# Patient Record
Sex: FEMALE | Race: BLACK OR AFRICAN AMERICAN | Employment: FULL TIME | URBAN - METROPOLITAN AREA
[De-identification: names, ages, dates, MRNs, and addresses within clinical notes are randomized per-mention and may not be internally consistent; named-entity substitution may affect disease eponyms.]

---

## 2019-01-13 ENCOUNTER — HOSPITAL ENCOUNTER (OUTPATIENT)
Age: 34
Discharge: HOME | End: 2019-01-13
Attending: OBSTETRICS & GYNECOLOGY | Admitting: OBSTETRICS & GYNECOLOGY
Payer: COMMERCIAL

## 2019-01-13 VITALS
HEART RATE: 101 BPM | WEIGHT: 292 LBS | BODY MASS INDEX: 44.25 KG/M2 | HEIGHT: 68 IN | TEMPERATURE: 97.9 F | RESPIRATION RATE: 18 BRPM | SYSTOLIC BLOOD PRESSURE: 130 MMHG | DIASTOLIC BLOOD PRESSURE: 64 MMHG

## 2019-01-13 PROBLEM — Z34.93 NORMAL PREGNANCY IN THIRD TRIMESTER: Status: ACTIVE | Noted: 2019-01-13

## 2019-01-13 PROCEDURE — 99211 OFF/OP EST MAY X REQ PHY/QHP: CPT

## 2019-01-16 ENCOUNTER — ANESTHESIA (OUTPATIENT)
Dept: LABOR AND DELIVERY | Age: 34
End: 2019-01-16
Payer: COMMERCIAL

## 2019-01-16 ENCOUNTER — HOSPITAL ENCOUNTER (INPATIENT)
Age: 34
LOS: 3 days | Discharge: HOME | End: 2019-01-19
Attending: OBSTETRICS & GYNECOLOGY | Admitting: SPECIALIST
Payer: COMMERCIAL

## 2019-01-16 ENCOUNTER — ANESTHESIA EVENT (OUTPATIENT)
Dept: LABOR AND DELIVERY | Age: 34
End: 2019-01-16
Payer: COMMERCIAL

## 2019-01-16 VITALS — DIASTOLIC BLOOD PRESSURE: 54 MMHG | SYSTOLIC BLOOD PRESSURE: 98 MMHG | OXYGEN SATURATION: 97 %

## 2019-01-16 PROBLEM — O26.93 PREGNANCY, COMPLICATED, THIRD TRIMESTER: Status: ACTIVE | Noted: 2019-01-16

## 2019-01-16 LAB
ABO/RH: NORMAL
ALBUMIN SERPL-MCNC: 3.4 G/DL (ref 3.5–5.2)
ALP BLD-CCNC: 183 U/L (ref 35–104)
ALT SERPL-CCNC: 9 U/L (ref 0–32)
AMPHETAMINE SCREEN, URINE: NOT DETECTED
ANION GAP SERPL CALCULATED.3IONS-SCNC: 13 MMOL/L (ref 7–16)
ANTIBODY SCREEN: NORMAL
AST SERPL-CCNC: 28 U/L (ref 0–31)
BARBITURATE SCREEN URINE: NOT DETECTED
BENZODIAZEPINE SCREEN, URINE: NOT DETECTED
BILIRUB SERPL-MCNC: 0.5 MG/DL (ref 0–1.2)
BUN BLDV-MCNC: 7 MG/DL (ref 6–20)
CALCIUM SERPL-MCNC: 9.2 MG/DL (ref 8.6–10.2)
CANNABINOID SCREEN URINE: NOT DETECTED
CHLORIDE BLD-SCNC: 105 MMOL/L (ref 98–107)
CO2: 21 MMOL/L (ref 22–29)
COCAINE METABOLITE SCREEN URINE: NOT DETECTED
CREAT SERPL-MCNC: 0.5 MG/DL (ref 0.5–1)
GFR AFRICAN AMERICAN: >60
GFR NON-AFRICAN AMERICAN: >60 ML/MIN/1.73
GLUCOSE BLD-MCNC: 81 MG/DL (ref 74–99)
HCT VFR BLD CALC: 35.8 % (ref 34–48)
HEMOGLOBIN: 11.4 G/DL (ref 11.5–15.5)
MCH RBC QN AUTO: 31.2 PG (ref 26–35)
MCHC RBC AUTO-ENTMCNC: 31.8 % (ref 32–34.5)
MCV RBC AUTO: 98.1 FL (ref 80–99.9)
METHADONE SCREEN, URINE: NOT DETECTED
OPIATE SCREEN URINE: NOT DETECTED
PDW BLD-RTO: 13.1 FL (ref 11.5–15)
PHENCYCLIDINE SCREEN URINE: NOT DETECTED
PLATELET # BLD: 258 E9/L (ref 130–450)
PMV BLD AUTO: 9.1 FL (ref 7–12)
POTASSIUM SERPL-SCNC: 5 MMOL/L (ref 3.5–5)
PROPOXYPHENE SCREEN: NOT DETECTED
RBC # BLD: 3.65 E12/L (ref 3.5–5.5)
SODIUM BLD-SCNC: 139 MMOL/L (ref 132–146)
TOTAL PROTEIN: 7 G/DL (ref 6.4–8.3)
WBC # BLD: 8.7 E9/L (ref 4.5–11.5)

## 2019-01-16 PROCEDURE — 2709999900 HC NON-CHARGEABLE SUPPLY: Performed by: SPECIALIST

## 2019-01-16 PROCEDURE — 2500000003 HC RX 250 WO HCPCS: Performed by: ANESTHESIOLOGY

## 2019-01-16 PROCEDURE — 86901 BLOOD TYPING SEROLOGIC RH(D): CPT

## 2019-01-16 PROCEDURE — 6360000002 HC RX W HCPCS: Performed by: SPECIALIST

## 2019-01-16 PROCEDURE — 6360000002 HC RX W HCPCS: Performed by: ANESTHESIOLOGY

## 2019-01-16 PROCEDURE — 2580000003 HC RX 258: Performed by: SPECIALIST

## 2019-01-16 PROCEDURE — 2580000003 HC RX 258: Performed by: ANESTHESIOLOGY

## 2019-01-16 PROCEDURE — 36415 COLL VENOUS BLD VENIPUNCTURE: CPT

## 2019-01-16 PROCEDURE — 80053 COMPREHEN METABOLIC PANEL: CPT

## 2019-01-16 PROCEDURE — 6360000002 HC RX W HCPCS: Performed by: ADVANCED PRACTICE MIDWIFE

## 2019-01-16 PROCEDURE — 86850 RBC ANTIBODY SCREEN: CPT

## 2019-01-16 PROCEDURE — 3700000000 HC ANESTHESIA ATTENDED CARE: Performed by: SPECIALIST

## 2019-01-16 PROCEDURE — 51702 INSERT TEMP BLADDER CATH: CPT

## 2019-01-16 PROCEDURE — 2500000003 HC RX 250 WO HCPCS: Performed by: NURSE ANESTHETIST, CERTIFIED REGISTERED

## 2019-01-16 PROCEDURE — 2500000003 HC RX 250 WO HCPCS

## 2019-01-16 PROCEDURE — 6360000002 HC RX W HCPCS: Performed by: NURSE ANESTHETIST, CERTIFIED REGISTERED

## 2019-01-16 PROCEDURE — 1220000001 HC SEMI PRIVATE L&D R&B

## 2019-01-16 PROCEDURE — 3700000001 HC ADD 15 MINUTES (ANESTHESIA): Performed by: SPECIALIST

## 2019-01-16 PROCEDURE — 4A1HXCZ MONITORING OF PRODUCTS OF CONCEPTION, CARDIAC RATE, EXTERNAL APPROACH: ICD-10-PCS | Performed by: SPECIALIST

## 2019-01-16 PROCEDURE — 2580000003 HC RX 258: Performed by: ADVANCED PRACTICE MIDWIFE

## 2019-01-16 PROCEDURE — 85027 COMPLETE CBC AUTOMATED: CPT

## 2019-01-16 PROCEDURE — 80307 DRUG TEST PRSMV CHEM ANLYZR: CPT

## 2019-01-16 PROCEDURE — 3609079900 HC CESAREAN SECTION: Performed by: SPECIALIST

## 2019-01-16 PROCEDURE — 86900 BLOOD TYPING SEROLOGIC ABO: CPT

## 2019-01-16 PROCEDURE — 6370000000 HC RX 637 (ALT 250 FOR IP): Performed by: SPECIALIST

## 2019-01-16 RX ORDER — ONDANSETRON 2 MG/ML
4 INJECTION INTRAMUSCULAR; INTRAVENOUS EVERY 6 HOURS PRN
Status: DISCONTINUED | OUTPATIENT
Start: 2019-01-16 | End: 2019-01-19 | Stop reason: HOSPADM

## 2019-01-16 RX ORDER — ACETAMINOPHEN 325 MG/1
325 TABLET ORAL EVERY 4 HOURS PRN
Status: DISCONTINUED | OUTPATIENT
Start: 2019-01-16 | End: 2019-01-19 | Stop reason: HOSPADM

## 2019-01-16 RX ORDER — SODIUM CHLORIDE 0.9 % (FLUSH) 0.9 %
10 SYRINGE (ML) INJECTION PRN
Status: DISCONTINUED | OUTPATIENT
Start: 2019-01-16 | End: 2019-01-16

## 2019-01-16 RX ORDER — FENTANYL CITRATE 50 UG/ML
INJECTION, SOLUTION INTRAMUSCULAR; INTRAVENOUS PRN
Status: DISCONTINUED | OUTPATIENT
Start: 2019-01-16 | End: 2019-01-16 | Stop reason: SDUPTHER

## 2019-01-16 RX ORDER — EPHEDRINE SULFATE/0.9% NACL/PF 50 MG/5 ML
10 SYRINGE (ML) INTRAVENOUS ONCE
Status: COMPLETED | OUTPATIENT
Start: 2019-01-16 | End: 2019-01-16

## 2019-01-16 RX ORDER — KETOROLAC TROMETHAMINE 30 MG/ML
30 INJECTION, SOLUTION INTRAMUSCULAR; INTRAVENOUS EVERY 6 HOURS
Status: DISPENSED | OUTPATIENT
Start: 2019-01-16 | End: 2019-01-17

## 2019-01-16 RX ORDER — EPHEDRINE SULFATE 50 MG/ML
25 INJECTION, SOLUTION INTRAVENOUS ONCE
Status: DISCONTINUED | OUTPATIENT
Start: 2019-01-16 | End: 2019-01-16 | Stop reason: ALTCHOICE

## 2019-01-16 RX ORDER — NALOXONE HYDROCHLORIDE 0.4 MG/ML
0.4 INJECTION, SOLUTION INTRAMUSCULAR; INTRAVENOUS; SUBCUTANEOUS PRN
Status: DISCONTINUED | OUTPATIENT
Start: 2019-01-16 | End: 2019-01-19 | Stop reason: HOSPADM

## 2019-01-16 RX ORDER — TRISODIUM CITRATE DIHYDRATE AND CITRIC ACID MONOHYDRATE 500; 334 MG/5ML; MG/5ML
30 SOLUTION ORAL ONCE
Status: COMPLETED | OUTPATIENT
Start: 2019-01-16 | End: 2019-01-16

## 2019-01-16 RX ORDER — CEFAZOLIN SODIUM 2 G/50ML
2 SOLUTION INTRAVENOUS ONCE
Status: COMPLETED | OUTPATIENT
Start: 2019-01-16 | End: 2019-01-16

## 2019-01-16 RX ORDER — SODIUM CHLORIDE 0.9 % (FLUSH) 0.9 %
10 SYRINGE (ML) INJECTION EVERY 12 HOURS SCHEDULED
Status: DISCONTINUED | OUTPATIENT
Start: 2019-01-16 | End: 2019-01-19 | Stop reason: HOSPADM

## 2019-01-16 RX ORDER — SODIUM CHLORIDE 0.9 % (FLUSH) 0.9 %
10 SYRINGE (ML) INJECTION EVERY 12 HOURS SCHEDULED
Status: DISCONTINUED | OUTPATIENT
Start: 2019-01-16 | End: 2019-01-16

## 2019-01-16 RX ORDER — LANOLIN 100 %
OINTMENT (GRAM) TOPICAL
Status: DISCONTINUED | OUTPATIENT
Start: 2019-01-16 | End: 2019-01-19 | Stop reason: HOSPADM

## 2019-01-16 RX ORDER — EPHEDRINE SULFATE/0.9% NACL/PF 50 MG/5 ML
SYRINGE (ML) INTRAVENOUS PRN
Status: DISCONTINUED | OUTPATIENT
Start: 2019-01-16 | End: 2019-01-16 | Stop reason: SDUPTHER

## 2019-01-16 RX ORDER — SODIUM CHLORIDE, SODIUM LACTATE, POTASSIUM CHLORIDE, CALCIUM CHLORIDE 600; 310; 30; 20 MG/100ML; MG/100ML; MG/100ML; MG/100ML
INJECTION, SOLUTION INTRAVENOUS CONTINUOUS
Status: DISCONTINUED | OUTPATIENT
Start: 2019-01-16 | End: 2019-01-16

## 2019-01-16 RX ORDER — OXYCODONE HYDROCHLORIDE AND ACETAMINOPHEN 5; 325 MG/1; MG/1
1 TABLET ORAL EVERY 4 HOURS PRN
Status: DISCONTINUED | OUTPATIENT
Start: 2019-01-16 | End: 2019-01-17

## 2019-01-16 RX ORDER — IBUPROFEN 600 MG/1
600 TABLET ORAL EVERY 6 HOURS PRN
Status: DISCONTINUED | OUTPATIENT
Start: 2019-01-16 | End: 2019-01-19 | Stop reason: HOSPADM

## 2019-01-16 RX ORDER — SODIUM CHLORIDE, SODIUM LACTATE, POTASSIUM CHLORIDE, AND CALCIUM CHLORIDE .6; .31; .03; .02 G/100ML; G/100ML; G/100ML; G/100ML
500 INJECTION, SOLUTION INTRAVENOUS ONCE
Status: COMPLETED | OUTPATIENT
Start: 2019-01-16 | End: 2019-01-16

## 2019-01-16 RX ORDER — OXYCODONE HYDROCHLORIDE 5 MG/1
5 TABLET ORAL EVERY 4 HOURS PRN
Status: DISCONTINUED | OUTPATIENT
Start: 2019-01-16 | End: 2019-01-17

## 2019-01-16 RX ORDER — DOCUSATE SODIUM 100 MG/1
100 CAPSULE, LIQUID FILLED ORAL 2 TIMES DAILY
Status: DISCONTINUED | OUTPATIENT
Start: 2019-01-16 | End: 2019-01-19 | Stop reason: HOSPADM

## 2019-01-16 RX ORDER — NALBUPHINE HCL 10 MG/ML
5 AMPUL (ML) INJECTION EVERY 4 HOURS PRN
Status: DISPENSED | OUTPATIENT
Start: 2019-01-16 | End: 2019-01-17

## 2019-01-16 RX ORDER — OXYCODONE HYDROCHLORIDE 5 MG/1
10 TABLET ORAL EVERY 4 HOURS PRN
Status: DISCONTINUED | OUTPATIENT
Start: 2019-01-16 | End: 2019-01-17

## 2019-01-16 RX ORDER — SODIUM CHLORIDE, SODIUM LACTATE, POTASSIUM CHLORIDE, CALCIUM CHLORIDE 600; 310; 30; 20 MG/100ML; MG/100ML; MG/100ML; MG/100ML
INJECTION, SOLUTION INTRAVENOUS CONTINUOUS
Status: DISCONTINUED | OUTPATIENT
Start: 2019-01-16 | End: 2019-01-19 | Stop reason: HOSPADM

## 2019-01-16 RX ORDER — EPHEDRINE SULFATE/0.9% NACL/PF 50 MG/5 ML
SYRINGE (ML) INTRAVENOUS
Status: COMPLETED
Start: 2019-01-16 | End: 2019-01-16

## 2019-01-16 RX ORDER — ONDANSETRON 2 MG/ML
INJECTION INTRAMUSCULAR; INTRAVENOUS PRN
Status: DISCONTINUED | OUTPATIENT
Start: 2019-01-16 | End: 2019-01-16 | Stop reason: SDUPTHER

## 2019-01-16 RX ORDER — SODIUM CHLORIDE 0.9 % (FLUSH) 0.9 %
10 SYRINGE (ML) INJECTION PRN
Status: DISCONTINUED | OUTPATIENT
Start: 2019-01-16 | End: 2019-01-19 | Stop reason: HOSPADM

## 2019-01-16 RX ADMIN — Medication 500 ML: at 13:35

## 2019-01-16 RX ADMIN — PHENYLEPHRINE HYDROCHLORIDE 100 MCG: 10 INJECTION INTRAVENOUS at 13:23

## 2019-01-16 RX ADMIN — KETOROLAC TROMETHAMINE 30 MG: 30 INJECTION, SOLUTION INTRAMUSCULAR at 17:01

## 2019-01-16 RX ADMIN — EPHEDRINE SULFATE 25 MG: 50 INJECTION INTRAMUSCULAR; INTRAVENOUS; SUBCUTANEOUS at 14:37

## 2019-01-16 RX ADMIN — Medication 999 MILLI-UNITS/MIN: at 13:34

## 2019-01-16 RX ADMIN — Medication 10 MG: at 14:37

## 2019-01-16 RX ADMIN — SODIUM CHLORIDE, POTASSIUM CHLORIDE, SODIUM LACTATE AND CALCIUM CHLORIDE: 600; 310; 30; 20 INJECTION, SOLUTION INTRAVENOUS at 13:00

## 2019-01-16 RX ADMIN — FENTANYL CITRATE 100 MCG: 50 INJECTION, SOLUTION INTRAMUSCULAR; INTRAVENOUS at 13:58

## 2019-01-16 RX ADMIN — SODIUM CHLORIDE, POTASSIUM CHLORIDE, SODIUM LACTATE AND CALCIUM CHLORIDE: 600; 310; 30; 20 INJECTION, SOLUTION INTRAVENOUS at 13:30

## 2019-01-16 RX ADMIN — SODIUM CHLORIDE, POTASSIUM CHLORIDE, SODIUM LACTATE AND CALCIUM CHLORIDE: 600; 310; 30; 20 INJECTION, SOLUTION INTRAVENOUS at 10:50

## 2019-01-16 RX ADMIN — Medication 10 MG: at 13:30

## 2019-01-16 RX ADMIN — SODIUM CHLORIDE, POTASSIUM CHLORIDE, SODIUM LACTATE AND CALCIUM CHLORIDE: 600; 310; 30; 20 INJECTION, SOLUTION INTRAVENOUS at 12:14

## 2019-01-16 RX ADMIN — SODIUM CHLORIDE, POTASSIUM CHLORIDE, SODIUM LACTATE AND CALCIUM CHLORIDE: 600; 310; 30; 20 INJECTION, SOLUTION INTRAVENOUS at 14:49

## 2019-01-16 RX ADMIN — CEFAZOLIN SODIUM 2 G: 2 SOLUTION INTRAVENOUS at 12:40

## 2019-01-16 RX ADMIN — SODIUM CHLORIDE, POTASSIUM CHLORIDE, SODIUM LACTATE AND CALCIUM CHLORIDE 500 ML: 600; 310; 30; 20 INJECTION, SOLUTION INTRAVENOUS at 14:30

## 2019-01-16 RX ADMIN — ONDANSETRON 4 MG: 2 INJECTION INTRAMUSCULAR; INTRAVENOUS at 13:36

## 2019-01-16 RX ADMIN — ONDANSETRON 4 MG: 2 INJECTION INTRAMUSCULAR; INTRAVENOUS at 16:58

## 2019-01-16 RX ADMIN — SODIUM CITRATE AND CITRIC ACID MONOHYDRATE 30 ML: 500; 334 SOLUTION ORAL at 12:01

## 2019-01-16 RX ADMIN — PHENYLEPHRINE HYDROCHLORIDE 100 MCG: 10 INJECTION INTRAVENOUS at 13:26

## 2019-01-16 RX ADMIN — Medication 50 MILLI-UNITS/MIN: at 15:28

## 2019-01-16 ASSESSMENT — PULMONARY FUNCTION TESTS
PIF_VALUE: 0
PIF_VALUE: 1
PIF_VALUE: 0
PIF_VALUE: 1
PIF_VALUE: 0

## 2019-01-16 ASSESSMENT — PAIN SCALES - GENERAL: PAINLEVEL_OUTOF10: 4

## 2019-01-17 LAB
HCT VFR BLD CALC: 33 % (ref 34–48)
HEMOGLOBIN: 10.4 G/DL (ref 11.5–15.5)
MCH RBC QN AUTO: 31 PG (ref 26–35)
MCHC RBC AUTO-ENTMCNC: 31.5 % (ref 32–34.5)
MCV RBC AUTO: 98.2 FL (ref 80–99.9)
PDW BLD-RTO: 13.1 FL (ref 11.5–15)
PLATELET # BLD: 248 E9/L (ref 130–450)
PMV BLD AUTO: 9.4 FL (ref 7–12)
RBC # BLD: 3.36 E12/L (ref 3.5–5.5)
WBC # BLD: 11.2 E9/L (ref 4.5–11.5)

## 2019-01-17 PROCEDURE — 6360000002 HC RX W HCPCS: Performed by: ANESTHESIOLOGY

## 2019-01-17 PROCEDURE — 85027 COMPLETE CBC AUTOMATED: CPT

## 2019-01-17 PROCEDURE — 2580000003 HC RX 258: Performed by: ADVANCED PRACTICE MIDWIFE

## 2019-01-17 PROCEDURE — 36415 COLL VENOUS BLD VENIPUNCTURE: CPT

## 2019-01-17 PROCEDURE — 1220000001 HC SEMI PRIVATE L&D R&B

## 2019-01-17 PROCEDURE — 6370000000 HC RX 637 (ALT 250 FOR IP): Performed by: ADVANCED PRACTICE MIDWIFE

## 2019-01-17 PROCEDURE — 6360000002 HC RX W HCPCS: Performed by: ADVANCED PRACTICE MIDWIFE

## 2019-01-17 RX ORDER — OXYCODONE HYDROCHLORIDE AND ACETAMINOPHEN 5; 325 MG/1; MG/1
1 TABLET ORAL EVERY 4 HOURS PRN
Status: DISCONTINUED | OUTPATIENT
Start: 2019-01-17 | End: 2019-01-19 | Stop reason: HOSPADM

## 2019-01-17 RX ORDER — OXYCODONE HYDROCHLORIDE AND ACETAMINOPHEN 5; 325 MG/1; MG/1
2 TABLET ORAL EVERY 4 HOURS PRN
Status: DISCONTINUED | OUTPATIENT
Start: 2019-01-17 | End: 2019-01-19 | Stop reason: HOSPADM

## 2019-01-17 RX ADMIN — OXYCODONE AND ACETAMINOPHEN 1 TABLET: 5; 325 TABLET ORAL at 14:08

## 2019-01-17 RX ADMIN — IBUPROFEN 600 MG: 600 TABLET ORAL at 15:57

## 2019-01-17 RX ADMIN — ENOXAPARIN SODIUM 40 MG: 100 INJECTION SUBCUTANEOUS at 06:36

## 2019-01-17 RX ADMIN — DOCUSATE SODIUM 100 MG: 100 CAPSULE, LIQUID FILLED ORAL at 08:13

## 2019-01-17 RX ADMIN — KETOROLAC TROMETHAMINE 30 MG: 30 INJECTION, SOLUTION INTRAMUSCULAR at 06:29

## 2019-01-17 RX ADMIN — NALBUPHINE HYDROCHLORIDE 5 MG: 10 INJECTION, SOLUTION INTRAMUSCULAR; INTRAVENOUS; SUBCUTANEOUS at 04:29

## 2019-01-17 RX ADMIN — OXYCODONE AND ACETAMINOPHEN 2 TABLET: 5; 325 TABLET ORAL at 19:02

## 2019-01-17 RX ADMIN — Medication 10 ML: at 06:29

## 2019-01-17 RX ADMIN — DOCUSATE SODIUM 100 MG: 100 CAPSULE, LIQUID FILLED ORAL at 20:15

## 2019-01-17 ASSESSMENT — PAIN DESCRIPTION - FREQUENCY
FREQUENCY: INTERMITTENT

## 2019-01-17 ASSESSMENT — PAIN DESCRIPTION - DESCRIPTORS
DESCRIPTORS: ACHING;DISCOMFORT;CRAMPING
DESCRIPTORS: CRAMPING;SHARP
DESCRIPTORS: CRAMPING;SHARP

## 2019-01-17 ASSESSMENT — PAIN SCALES - GENERAL
PAINLEVEL_OUTOF10: 0
PAINLEVEL_OUTOF10: 6
PAINLEVEL_OUTOF10: 5
PAINLEVEL_OUTOF10: 0
PAINLEVEL_OUTOF10: 7
PAINLEVEL_OUTOF10: 8

## 2019-01-17 ASSESSMENT — PAIN DESCRIPTION - PAIN TYPE
TYPE: SURGICAL PAIN

## 2019-01-17 ASSESSMENT — PAIN DESCRIPTION - PROGRESSION
CLINICAL_PROGRESSION: GRADUALLY WORSENING
CLINICAL_PROGRESSION: GRADUALLY IMPROVING
CLINICAL_PROGRESSION: GRADUALLY IMPROVING

## 2019-01-17 ASSESSMENT — PAIN DESCRIPTION - ONSET
ONSET: ON-GOING
ONSET: ON-GOING

## 2019-01-17 ASSESSMENT — PAIN DESCRIPTION - ORIENTATION
ORIENTATION: ANTERIOR

## 2019-01-17 ASSESSMENT — PAIN DESCRIPTION - LOCATION
LOCATION: ABDOMEN

## 2019-01-18 PROBLEM — Z34.93 NORMAL PREGNANCY IN THIRD TRIMESTER: Status: RESOLVED | Noted: 2019-01-13 | Resolved: 2019-01-18

## 2019-01-18 PROCEDURE — 1220000001 HC SEMI PRIVATE L&D R&B

## 2019-01-18 PROCEDURE — 6370000000 HC RX 637 (ALT 250 FOR IP): Performed by: ADVANCED PRACTICE MIDWIFE

## 2019-01-18 PROCEDURE — 6360000002 HC RX W HCPCS: Performed by: ADVANCED PRACTICE MIDWIFE

## 2019-01-18 RX ORDER — OXYCODONE HYDROCHLORIDE AND ACETAMINOPHEN 5; 325 MG/1; MG/1
1 TABLET ORAL EVERY 6 HOURS PRN
Qty: 28 TABLET | Refills: 0 | Status: SHIPPED | OUTPATIENT
Start: 2019-01-18 | End: 2019-01-25

## 2019-01-18 RX ADMIN — IBUPROFEN 600 MG: 600 TABLET ORAL at 01:27

## 2019-01-18 RX ADMIN — OXYCODONE AND ACETAMINOPHEN 2 TABLET: 5; 325 TABLET ORAL at 18:55

## 2019-01-18 RX ADMIN — OXYCODONE AND ACETAMINOPHEN 2 TABLET: 5; 325 TABLET ORAL at 04:22

## 2019-01-18 RX ADMIN — IBUPROFEN 600 MG: 600 TABLET ORAL at 12:18

## 2019-01-18 RX ADMIN — DOCUSATE SODIUM 100 MG: 100 CAPSULE, LIQUID FILLED ORAL at 09:16

## 2019-01-18 RX ADMIN — OXYCODONE AND ACETAMINOPHEN 2 TABLET: 5; 325 TABLET ORAL at 23:07

## 2019-01-18 RX ADMIN — OXYCODONE AND ACETAMINOPHEN 2 TABLET: 5; 325 TABLET ORAL at 09:16

## 2019-01-18 RX ADMIN — ENOXAPARIN SODIUM 40 MG: 100 INJECTION SUBCUTANEOUS at 09:17

## 2019-01-18 ASSESSMENT — PAIN DESCRIPTION - ORIENTATION
ORIENTATION: MID;LOWER
ORIENTATION: LOWER;MID
ORIENTATION: MID;LOWER

## 2019-01-18 ASSESSMENT — PAIN SCALES - GENERAL
PAINLEVEL_OUTOF10: 8
PAINLEVEL_OUTOF10: 3
PAINLEVEL_OUTOF10: 10
PAINLEVEL_OUTOF10: 6
PAINLEVEL_OUTOF10: 9
PAINLEVEL_OUTOF10: 0
PAINLEVEL_OUTOF10: 3
PAINLEVEL_OUTOF10: 3
PAINLEVEL_OUTOF10: 0
PAINLEVEL_OUTOF10: 3

## 2019-01-18 ASSESSMENT — PAIN DESCRIPTION - LOCATION
LOCATION: ABDOMEN

## 2019-01-18 ASSESSMENT — PAIN DESCRIPTION - PAIN TYPE
TYPE: SURGICAL PAIN

## 2019-01-18 ASSESSMENT — PAIN - FUNCTIONAL ASSESSMENT: PAIN_FUNCTIONAL_ASSESSMENT: 0-10

## 2019-01-18 ASSESSMENT — PAIN DESCRIPTION - DESCRIPTORS
DESCRIPTORS: CONSTANT;DISCOMFORT

## 2019-01-18 ASSESSMENT — PAIN DESCRIPTION - FREQUENCY
FREQUENCY: INTERMITTENT

## 2019-01-19 VITALS
WEIGHT: 293 LBS | BODY MASS INDEX: 44.41 KG/M2 | HEIGHT: 68 IN | DIASTOLIC BLOOD PRESSURE: 83 MMHG | SYSTOLIC BLOOD PRESSURE: 141 MMHG | TEMPERATURE: 98.6 F | RESPIRATION RATE: 16 BRPM | OXYGEN SATURATION: 98 % | HEART RATE: 94 BPM

## 2019-01-19 PROCEDURE — 6370000000 HC RX 637 (ALT 250 FOR IP): Performed by: ADVANCED PRACTICE MIDWIFE

## 2019-01-19 RX ADMIN — DOCUSATE SODIUM 100 MG: 100 CAPSULE, LIQUID FILLED ORAL at 08:18

## 2019-01-19 RX ADMIN — OXYCODONE AND ACETAMINOPHEN 2 TABLET: 5; 325 TABLET ORAL at 04:07

## 2019-01-19 RX ADMIN — IBUPROFEN 600 MG: 600 TABLET ORAL at 04:07

## 2019-01-19 RX ADMIN — OXYCODONE AND ACETAMINOPHEN 1 TABLET: 5; 325 TABLET ORAL at 08:23

## 2019-01-19 ASSESSMENT — PAIN SCALES - GENERAL
PAINLEVEL_OUTOF10: 6
PAINLEVEL_OUTOF10: 8
PAINLEVEL_OUTOF10: 2

## 2019-12-12 ENCOUNTER — HOSPITAL ENCOUNTER (EMERGENCY)
Age: 34
Discharge: HOME | End: 2019-12-12
Payer: COMMERCIAL

## 2019-12-12 VITALS
DIASTOLIC BLOOD PRESSURE: 94 MMHG | SYSTOLIC BLOOD PRESSURE: 162 MMHG | HEART RATE: 74 BPM | WEIGHT: 264 LBS | RESPIRATION RATE: 16 BRPM | TEMPERATURE: 98.1 F | OXYGEN SATURATION: 100 % | BODY MASS INDEX: 40.14 KG/M2

## 2019-12-12 DIAGNOSIS — M79.18 MUSCULOSKELETAL PAIN: ICD-10-CM
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
## 2019-12-12 PROCEDURE — 6370000000 HC RX 637 (ALT 250 FOR IP): Performed by: NURSE PRACTITIONER

## 2019-12-12 PROCEDURE — 99283 EMERGENCY DEPT VISIT LOW MDM: CPT

## 2019-12-12 RX ORDER — CYCLOBENZAPRINE HCL 10 MG
10 TABLET ORAL ONCE
Status: COMPLETED | OUTPATIENT
Start: 2019-12-12 | End: 2019-12-12

## 2019-12-12 RX ORDER — NAPROXEN 500 MG/1
500 TABLET ORAL 2 TIMES DAILY
Qty: 14 TABLET | Refills: 0 | Status: SHIPPED | OUTPATIENT
Start: 2019-12-12 | End: 2020-10-21

## 2019-12-12 RX ORDER — NAPROXEN 500 MG/1
500 TABLET ORAL ONCE
Status: COMPLETED | OUTPATIENT
Start: 2019-12-12 | End: 2019-12-12

## 2019-12-12 RX ORDER — CYCLOBENZAPRINE HCL 10 MG
10 TABLET ORAL 3 TIMES DAILY PRN
Qty: 15 TABLET | Refills: 0 | Status: SHIPPED | OUTPATIENT
Start: 2019-12-12 | End: 2019-12-17

## 2019-12-12 RX ADMIN — CYCLOBENZAPRINE 10 MG: 10 TABLET, FILM COATED ORAL at 20:42

## 2019-12-12 RX ADMIN — NAPROXEN 500 MG: 500 TABLET ORAL at 20:42

## 2019-12-12 ASSESSMENT — PAIN DESCRIPTION - PAIN TYPE: TYPE: ACUTE PAIN

## 2019-12-12 ASSESSMENT — PAIN SCALES - GENERAL
PAINLEVEL_OUTOF10: 9
PAINLEVEL_OUTOF10: 8

## 2019-12-12 ASSESSMENT — PAIN DESCRIPTION - DESCRIPTORS: DESCRIPTORS: ACHING

## 2020-10-21 ENCOUNTER — INITIAL PRENATAL (OUTPATIENT)
Dept: OBGYN CLINIC | Age: 35
End: 2020-10-21
Payer: COMMERCIAL

## 2020-10-21 VITALS
DIASTOLIC BLOOD PRESSURE: 75 MMHG | SYSTOLIC BLOOD PRESSURE: 122 MMHG | HEART RATE: 92 BPM | TEMPERATURE: 97.3 F | BODY MASS INDEX: 42.27 KG/M2 | WEIGHT: 278 LBS

## 2020-10-21 PROBLEM — O09.812: Status: ACTIVE | Noted: 2020-10-21

## 2020-10-21 PROBLEM — O26.93 PREGNANCY, COMPLICATED, THIRD TRIMESTER: Status: RESOLVED | Noted: 2019-01-16 | Resolved: 2020-10-21

## 2020-10-21 PROBLEM — O24.415 GESTATIONAL DIABETES MELLITUS (GDM) IN SECOND TRIMESTER CONTROLLED ON ORAL HYPOGLYCEMIC DRUG: Status: ACTIVE | Noted: 2020-10-21

## 2020-10-21 PROBLEM — E55.9 VITAMIN D DEFICIENCY: Status: ACTIVE | Noted: 2020-10-21

## 2020-10-21 PROBLEM — Z3A.15 15 WEEKS GESTATION OF PREGNANCY: Status: ACTIVE | Noted: 2020-10-21

## 2020-10-21 PROBLEM — O34.29 PREGNANCY WITH HISTORY OF UTERINE MYOMECTOMY: Status: ACTIVE | Noted: 2020-10-21

## 2020-10-21 PROBLEM — O99.212 OBESITY COMPLICATING PERIPREGNANCY, ANTEPARTUM, SECOND TRIMESTER: Status: ACTIVE | Noted: 2020-10-21

## 2020-10-21 LAB
ACETONE URINE: NORMAL
GLUCOSE URINE, POC: NEGATIVE
KETONES, POC: NEGATIVE
PROTEIN UA: NEGATIVE

## 2020-10-21 PROCEDURE — 76805 OB US >/= 14 WKS SNGL FETUS: CPT | Performed by: OBSTETRICS & GYNECOLOGY

## 2020-10-21 PROCEDURE — 99203 OFFICE O/P NEW LOW 30 MIN: CPT | Performed by: OBSTETRICS & GYNECOLOGY

## 2020-10-21 PROCEDURE — 81002 URINALYSIS NONAUTO W/O SCOPE: CPT | Performed by: OBSTETRICS & GYNECOLOGY

## 2020-10-21 RX ORDER — ASPIRIN 81 MG/1
81 TABLET ORAL DAILY
COMMUNITY

## 2020-10-21 RX ORDER — METFORMIN HYDROCHLORIDE 750 MG/1
1000 TABLET, EXTENDED RELEASE ORAL
Status: ON HOLD | COMMUNITY
End: 2021-03-11

## 2020-10-21 NOTE — PROGRESS NOTES
[facility] FETAL MEDICINE  [address] [place], [place] [address] [phone]  DIABETES AND PREGNANCY PROGRAM CONSULT    Referring/Requesting Provider: Alejandro Garcia MD   PCP: Naida Berrios MD      [place] of the present illness:  Radha Loyola is a 38 y.o. [id number] with consultation regarding gestational diabetes mellitus. She is here for evaluation regarding GDM on Metformin, S/P IVF, historty or myomectomy and repeat CS, and vitamin D deficiency. She is on Metformin 750 mg daily. She did not bring her log today. She will call us today to report her blood sugars and will be back after 2 weeks to evaluate her glycemic control. The patient denies nausea, vomiting, vaginal bleeding, leakage of fluid, contractions, and/or abdominal pain. She also denies blurring of vision, visual disturbances, headaches, and /or epigastric pain. She reports the fetus to be active.       Past Medical History:   Diagnosis Date    Abnormal Pap smear of cervix     years ago    Anemia     history of    Gestational diabetes mellitus     with first pregnancy    Infertility, female     IVF transfer this pregnancy       OB History    Para Term  AB Living   3 1 1   1 1   SAB TAB Ectopic Molar Multiple Live Births   1         1      # Outcome Date GA Lbr Armando/2nd Weight Sex Delivery Anes PTL Lv   3 Current            2 Term 19 37w6d  9 lb 10 oz (4.366 kg) M CS-LTranv  N HIREN      Birth Comments:  due to prior fibroid surgery   1 SAB 17 8w0d       FD       Past Surgical History:   Procedure Laterality Date    ABDOMEN SURGERY      fibroids and cysts removed     SECTION N/A 2019     SECTION performed by Eugenio Brito MD at [facility] L&D [facility] AND CURETTAGE OF UTERUS      DILATION AND CURETTAGE OF UTERUS N/A 2017       No Known Allergies    Review of pertinent labs reveals the following:  Gestational Diabetes - Maternal Evaluation  Current Medications:  Current Outpatient Medications on File Prior to Visit   Medication Sig Dispense Refill    Prenatal Vit-Fe Fumarate-FA (PRENATAL VITAMIN PO) Take 1 tablet by mouth daily      metFORMIN (GLUCOPHAGE-XR) 750 MG extended release tablet Take 750 mg by mouth daily (with breakfast)      aspirin 81 MG EC tablet Take 81 mg by mouth daily       No current facility-administered medications on file prior to visit. Review of the systems:  Gm Madera denies fever, chills, headaches, visual changes, stuffy/running nose, sore throat, chest pain, heart palpitations, edema, pain with breathing, shortness of breath, nausea or vomiting, difficult or painful urination, joint or muscle pain, numbness, tingling, tremors, and/or seizures. Physical Exam  Vitals signs and nursing note reviewed. /75   Pulse 92   Temp 97.3 °F (36.3 °C) (Temporal)   Wt 278 lb (126.1 kg)   LMP 07/02/2020   BMI 42.27 kg/m²   Constitutional: Appearance: She is obese  Heart rate is regular  Neck is supple with normal range of motion  No respiratory distress  Abdomen is soft  Neurological:      General: No focal deficit present. Mental Status: She is alert and oriented to person, place, and time. No calf tenderness  Psychiatric:         Mood and Affect: Mood normal.         Behavior: Behavior normal.    Fetal heart tones: Positive    IMPRESSION:Karina is a 38 y.o. [id number] at 04G3W complicated by pregestational diabetes mellitus A2_      Patient Active Problem List    Diagnosis Date Noted    15 weeks gestation of pregnancy 10/21/2020     Overview Note:     - Ultrasound is done and Estimated Date of Delivery: 4/8/21  - Fetal size matches dates. - Incomplete anatomy scan due to early gestational age at 13 w 6d    Recommendations:  1. Anatomy scan at 18 weeks  2. Fetal echocardiogram at 22 weeks secondarry to IVF. 3. US for growth every 4 weeks.   4. Twice weekly BPP after 32 weeks            Gestational diabetes mellitus (GDM) in second trimester controlled on oral hypoglycemic drug 10/21/2020     Overview Note:     Consultation today included education on:   Optimizing maternal and fetal outcomes in pregnancy complicated by diabetes   An individualized diabetic consistent carbohydrate meal plan was created by a registered dietician who is a certified diabetes educator. The patient was also counseled on the importance of meal timing and choices. I recommend the following for her diabetic meal plan:    Consistent carbohydrate meal plan with at least 180 grams of carbohydrates divided into 3 meals and 3 snacks every 2-3 hours apart   Breakfast: 30 grams of carbohydrates with protein   Morning snack: 15 grams of carbohydrates with protein   Lunch: 45 grams of carbohydrates with protein   Snack: 15 grams of carbohydrates with protein   Dinner: 60 grams of c carbohydrates with protein   Bedtime snack: 15 grams of carbohydrates with protein   At least 30-60 minutes of physical activity 5-7 days per week   Self-monitoring blood glucose 4-7 times a day   Test as fasting, pre-meal and 1-hr postprandial after the start of the meal   Goal for fasting and premeals is 60-90 mg/dL, 1 hour postprandial  (2 hour < 120)   Use of a blood sugar log and food diary   When to call our office when blood sugars are outside of goal (> 200 for 2 readings in a 24 hour period, 3 days of blood sugar above goal, or hypoglycemia requiring treatment)   Management of hypoglycemia, including the correct use of glucose tabs, as well as Glucagon emergency kit.  For symptomatic blood glucose < 60 mg/dL (or < 50 mg/dL without symptoms): Treat with 4 ounces (1/2 cup) of apple or grape juice OR 4 glucose tabs with 8 oz of water. These are simple carbohydrate and are rapidly absorbed.  Following treatment of low blood glucose, she is also to recheck her blood sugar every 15 minutes after treatment until the blood glucose is > 60 Anatomy scan at 18 weeks  2. Fetal echocardiogram at 22 weeks secondary to IVF. 3. US for growth every 4 weeks.  Vitamin D deficiency 10/21/2020     Overview Note:     I recommend vitamin D 2,000 Units daily      Obesity complicating peripregnancy, antepartum, second trimester 10/21/2020     Overview Note:     Dietary consultation  Post delivery weight loss program.       delivery delivered 2019    Follow up: She will call us today to report her blood sugars and will be back after 2 weeks to evaluate her glycemic control. She did not bring her log today. The total patient time of the visit was 30 minutes, of which was greater than 50% of the time was spent counseling and coordinating care.            Garret Lockhart MD

## 2020-10-21 NOTE — LETTER
[facility] MATERNAL FETAL MEDICINE  [address] [address]   Dept: [phone]  Loc: [phone]  Chelle Brewster MD                                           [facility] Consultation Letter     10/21/20     Alejandro Garcia MD     Re: Patient: Mark Galvan  YOB: 1985    MR Number: [id number] Date of Visit: 10/21/2020     Dear Dr. Adrienne Piña had the pleasure of seeing your patient, Mark Galvan, in consultation in the [facility] Fetal Medicine Department of [facility]). [address] [place] [address] FETAL MEDICINE  [address] [address] [phone]  DIABETES AND PREGNANCY PROGRAM CONSULT    Referring/Requesting Provider: Alejandro Garcia MD   PCP: Naida Berrios MD      [facility] of the present illness:  Radha Loyola is a 38 y.o. [id number] with consultation regarding gestational diabetes mellitus. She is here for evaluation regarding GDM on Metformin, S/P IVF, historty or myomectomy and repeat CS, and vitamin D deficiency. She is on Metformin 750 mg daily. She did not bring her log today. She will call us today to report her blood sugars and will be back after 2 weeks to evaluate her glycemic control. The patient denies nausea, vomiting, vaginal bleeding, leakage of fluid, contractions, and/or abdominal pain. She also denies blurring of vision, visual disturbances, headaches, and /or epigastric pain. She reports the fetus to be active.       Past Medical History:   Diagnosis Date    Abnormal Pap smear of cervix     years ago    Anemia     history of    Gestational diabetes mellitus     with first pregnancy    Infertility, female     IVF transfer this pregnancy       OB History    Para Term  AB Living   3 1 1   1 1   SAB TAB Ectopic Molar Multiple Live Births   1         1      # Outcome Date GA Lbr Armando/2nd Weight Sex Delivery Anes PTL Lv   3 Current            2 Term 19 37w6d  9 lb 10 oz (4.366 kg) M CS-LTranv  N HIREN Birth Comments:  due to prior fibroid surgery   1 SAB 17 8w0d       FD       Past Surgical History:   Procedure Laterality Date    ABDOMEN SURGERY      fibroids and cysts removed     SECTION N/A 2019     SECTION performed by Cata Mak MD at [facility] L&D [facility] AND CURETTAGE OF UTERUS      DILATION AND CURETTAGE OF UTERUS N/A 2017       No Known Allergies    Review of pertinent labs reveals the following:  Gestational Diabetes  Maternal Evaluation  Current Medications:  Current Outpatient Medications on File Prior to Visit   Medication Sig Dispense Refill    Prenatal Vit-Fe Fumarate-FA (PRENATAL VITAMIN PO) Take 1 tablet by mouth daily      metFORMIN (GLUCOPHAGE-XR) 750 MG extended release tablet Take 750 mg by mouth daily (with breakfast)      aspirin 81 MG EC tablet Take 81 mg by mouth daily       No current facility-administered medications on file prior to visit. Review of the systems:  Verba Plate denies fever, chills, headaches, visual changes, stuffy/running nose, sore throat, chest pain, heart palpitations, edema, pain with breathing, shortness of breath, nausea or vomiting, difficult or painful urination, joint or muscle pain, numbness, tingling, tremors, and/or seizures. Physical Exam  Vitals signs and nursing note reviewed. /75   Pulse 92   Temp 97.3 °F (36.3 °C) (Temporal)   Wt 278 lb (126.1 kg)   LMP 2020   BMI 42.27 kg/m²   Constitutional: Appearance: She is obese  Heart rate is regular  Neck is supple with normal range of motion  No respiratory distress  Abdomen is soft  Neurological:      General: No focal deficit present. Mental Status: She is alert and oriented to person, place, and time.    No calf tenderness  Psychiatric:         Mood and Affect: Mood normal.         Behavior: Behavior normal.    Fetal heart tones: Positive Sacha Estrada is a 38 y.o. [id number] at 37R8O complicated by pregestational diabetes mellitus A2_      Patient Active Problem List    Diagnosis Date Noted    15 weeks gestation of pregnancy 10/21/2020     Overview Note:     - Ultrasound is done and Estimated Date of Delivery: 4/8/21  - Fetal size matches dates. - Incomplete anatomy scan due to early gestational age at 13 w 6d    Recommendations:  1. Anatomy scan at 18 weeks  2. Fetal echocardiogram at 22 weeks secondarry to IVF. 3. US for growth every 4 weeks. 4. Twice weekly BPP after 32 weeks            Gestational diabetes mellitus (GDM) in second trimester controlled on oral hypoglycemic drug 10/21/2020     Overview Note:     Consultation today included education on:  ? Optimizing maternal and fetal outcomes in pregnancy complicated by diabetes  ? An individualized diabetic consistent carbohydrate meal plan was created by a registered dietician who is a certified diabetes educator. The patient was also counseled on the importance of meal timing and choices. I recommend the following for her diabetic meal plan:   ? Consistent carbohydrate meal plan with at least 180 grams of carbohydrates divided into 3 meals and 3 snacks every 2-3 hours apart  ? Breakfast: 30 grams of carbohydrates with protein  ? Morning snack: 15 grams of carbohydrates with protein  ? Lunch: 45 grams of carbohydrates with protein  ? Snack: 15 grams of carbohydrates with protein  ? Dinner: 60 grams of c carbohydrates with protein  ? Bedtime snack: 15 grams of carbohydrates with protein  ? At least 30-60 minutes of physical activity 5-7 days per week  ? Self-monitoring blood glucose 4-7 times a day  ? Test as fasting, pre-meal and 1-hr postprandial after the start of the meal  ? Goal for fasting and premeals is 60-90 mg/dL, 1 hour postprandial  (2 hour < 120)  ? Use of a blood sugar log and food diary  ?  When to call our office when blood sugars are outside of goal (> 200 for 2 readings in a 24 hour period, 3 days of blood sugar above goal, or hypoglycemia requiring treatment)  ? Management of hypoglycemia, including the correct use of glucose tabs, as well as Glucagon emergency kit. ? For symptomatic blood glucose < 60 mg/dL (or < 50 mg/dL without symptoms): Treat with 4 ounces (1/2 cup) of apple or grape juice OR 4 glucose tabs with 8 oz of water. These are simple carbohydrate and are rapidly absorbed. Following treatment of low blood glucose, she is also to recheck her blood sugar every 15 minutes after treatment until the blood glucose is > 60 mg/dL x 2 readings. (Blood sugar < 30 mg/dL, treat with 8 oz of juice). She was counseled that complex carbohydrates (candy, cookies, milk) is not the optimal treatment of hypoglycemia due to a delayed response. ? Glucagon is used when the blood sugar is < 50 mg/dL and cannot take PO, or if she is unresponsive or uncooperative. I recommend a family member be taught how to use Glucagon emergency kit. ? Breastfeeding was recommended and she was referred to a lactation specialist.    Recommendations:   ? Follow up in our office in 1 week for diabetes co-management. ? Comprehensive anatomical fetal survey between 18-20 weeks. ? Fetal echocardiogram at 22 weeks. ? Serial growth ultrasounds q 4 weeks starting at 24 weeks' gestation. ?  fetal testing 2 times a week and daily fetal kick counts starting at 32 weeks' gestation. ? Check TSH, free T4, urine protein:creatinine if not yet done this pregnancy. ? I ordered hemoglobin A1c  ? Recommend delivery at 39 weeks gestation, unless earlier delivery is indicated. Vaginal delivery is acceptable if the estimated fetal weight near delivery is < 4500 grams.  delivery recommended if EFW is > 4500 grams to decrease the risk of shoulder dystocia and risk of brachial plexus injury. ?  Postpartum, recommend 2 hour OGTT to rule out type II DM  Follow up: She will call us today to report her blood sugars and will be back after 2 weeks to evaluate her glycemic control. She did not bring her log today.  Pregnancy with history of uterine myomectomy 10/21/2020     Overview Note:     - The patient had myomectomy through a laparotomy. The myomectomy went through the whole uterine thickness. Recommend   1. Repeat CS at 37 weeks  2. The risk for uterine rupture has been discussed. Winsome Fried of preg rslt from assist reprodctv tech, second tri 10/21/2020     Overview Note:     - The patient had IVF with this pregnancy. Recommendations:  1. Anatomy scan at 18 weeks  2. Fetal echocardiogram at 22 weeks secondary to IVF. 3. US for growth every 4 weeks.  Vitamin D deficiency 10/21/2020     Overview Note:     I recommend vitamin D 2,000 Units daily      Obesity complicating peripregnancy, antepartum, second trimester 10/21/2020     Overview Note:     Dietary consultation  Post delivery weight loss program.       delivery delivered 2019    Follow up: She will call us today to report her blood sugars and will be back after 2 weeks to evaluate her glycemic control. She did not bring her log today. The total patient time of the visit was 30 minutes, of which was greater than 50% of the time was spent counseling and coordinating care. Laurent Velasquez MD                         Thank you for allowing us to participate in the care of your patient. Should you or the family have any questions or concerns, please do not hesitate to contact us.     Sincerely,    Laurent Velasquez MD

## 2020-10-24 ENCOUNTER — HOSPITAL ENCOUNTER (OUTPATIENT)
Age: 35
Discharge: HOME | End: 2020-10-24
Payer: COMMERCIAL

## 2020-10-24 LAB — HBA1C MFR BLD: 4.6 % (ref 4–5.6)

## 2020-10-24 PROCEDURE — 84702 CHORIONIC GONADOTROPIN TEST: CPT

## 2020-10-24 PROCEDURE — 83036 HEMOGLOBIN GLYCOSYLATED A1C: CPT

## 2020-10-24 PROCEDURE — 82105 ALPHA-FETOPROTEIN SERUM: CPT

## 2020-10-24 PROCEDURE — 36415 COLL VENOUS BLD VENIPUNCTURE: CPT

## 2020-10-24 PROCEDURE — 86336 INHIBIN A: CPT

## 2020-10-24 PROCEDURE — 82677 ASSAY OF ESTRIOL: CPT

## 2020-10-27 LAB
AFP INTERPRETATION: NORMAL
AFP MOM: 1.56
AFP SPECIMEN: NORMAL
D-INHIBIN: 165 PG/ML
DATING: NORMAL
EER MATERNAL SCREEN AFP, HCG, EST, INH: NORMAL
ESTIMATED DUE DATE: NORMAL
FETUS COUNT: NORMAL
GESTATIONAL AGE CALC AT COLLECT: NORMAL
HISTORY OF ANEUPLOIDY?: NO
HISTORY/NEURAL TUBE DEFECTS: NO
INSULIN DEP. DIABETIC: NO
MATERNAL AGE AT EDD: 37.9 YR
MATERNAL WEIGHT: NORMAL
MOM FOR HCG, 2ND TRIMESTER: 0.53
MOM FOR UE3: 1.3
MOM INHIBN: 1.45
PATIENT'S HCG, 2ND TRIMESTER: NORMAL IU/L
PT AFP: 31 NG/ML
PT UE3: 0.69 NG/ML
RACE: NORMAL
SMOKING: NORMAL

## 2020-11-02 ENCOUNTER — ROUTINE PRENATAL (OUTPATIENT)
Dept: OBGYN CLINIC | Age: 35
End: 2020-11-02
Payer: COMMERCIAL

## 2020-11-02 VITALS
HEART RATE: 105 BPM | TEMPERATURE: 97 F | WEIGHT: 282 LBS | BODY MASS INDEX: 42.88 KG/M2 | SYSTOLIC BLOOD PRESSURE: 135 MMHG | DIASTOLIC BLOOD PRESSURE: 83 MMHG

## 2020-11-02 PROBLEM — Z3A.17 17 WEEKS GESTATION OF PREGNANCY: Status: ACTIVE | Noted: 2020-11-02

## 2020-11-02 PROBLEM — Z3A.15 15 WEEKS GESTATION OF PREGNANCY: Status: RESOLVED | Noted: 2020-10-21 | Resolved: 2020-11-02

## 2020-11-02 LAB
ACETONE URINE: NORMAL
GLUCOSE URINE, POC: NEGATIVE
KETONES, POC: NEGATIVE
PROTEIN UA: NEGATIVE

## 2020-11-02 PROCEDURE — 99213 OFFICE O/P EST LOW 20 MIN: CPT | Performed by: OBSTETRICS & GYNECOLOGY

## 2020-11-02 PROCEDURE — 81002 URINALYSIS NONAUTO W/O SCOPE: CPT | Performed by: OBSTETRICS & GYNECOLOGY

## 2020-11-02 NOTE — PROGRESS NOTES
Diet difficult to follow for patient,eattiong bigger portions and always hungry  Given suggested menu plan, when blood sugars with in range very hungry  Feeling flutters  No bleeding, cramping or leakage of fluids  Ketone=negativeDenies headaches or blurred vision  Call your obstetrician for:    Bleeding, leakage of fluid, abdominal tenderness, headaches, burred vision or  epigastric pain or increased in urinary frequency.    Call if any questions or concerns

## 2020-11-02 NOTE — PROGRESS NOTES
DIABETES AND PREGNANCY PROGRAM COMANAGEMENT    Referring/Requesting Provider: Bolivar Cade MD   PCP: Roman Arroyo MD    CHIEF COMPLAINT: Danne Kenvir vs T2DM given early diagnosis prior to 20 weeks GA   HISTORY OF PRESENT ILLNESS:   Ke Cummings is a 38 y.o.  at 17w4d with GDMA2 vs T2DM (normal A1C) here for follow up of initial consultation to review FSBS. Taking only daily Metformin 750mg ER dosing. Having a hard time with diet and does not wish to come to as many visits given her lack of sick time and work duties. Blood glucose record was reviewed. Hyperglycemia is present, see below. I discussed my concerns with her current FSBS and the need for increasing dosing of Metformin vs starting insulin and wishes to wait on insulin currently, but understands this is a high possibility given her past pregnancy on insulin. No OB complaints, mostly concerned about early number of visits to our office.   I listened to her concerns today that she voiced         OB History    Para Term  AB Living   3 1 1   1 1   SAB TAB Ectopic Molar Multiple Live Births   1         1      # Outcome Date GA Lbr Armando/2nd Weight Sex Delivery Anes PTL Lv   3 Current            2 Term 19 37w6d  9 lb 10 oz (4.366 kg) M CS-LTranv  N HIREN      Birth Comments:  due to prior fibroid surgery   1 SAB 17 8w0d       FD     a  Past Medical History:   Diagnosis Date    Abnormal Pap smear of cervix     years ago    Anemia     history of    Gestational diabetes mellitus     with first pregnancy    Infertility, female     IVF transfer this pregnancy       Past Surgical History:   Procedure Laterality Date    ABDOMEN SURGERY      fibroids and cysts removed     SECTION N/A 2019     SECTION performed by Simran Chirinos MD at [facility] L&D [address] [facility] AND CURETTAGE OF UTERUS      DILATION AND CURETTAGE OF UTERUS N/A 2017       Current Outpatient Medications Medication Sig Dispense Refill    Prenatal Vit-Fe Fumarate-FA (PRENATAL VITAMIN PO) Take 1 tablet by mouth daily      metFORMIN (GLUCOPHAGE-XR) 750 MG extended release tablet Take 750 mg by mouth Daily with supper       aspirin 81 MG EC tablet Take 81 mg by mouth daily       No current facility-administered medications for this visit. No Known Allergies    PHYSICAL EXAM:  VITAL SIGNS: /83   Pulse 105   Temp 97 °F (36.1 °C) (Temporal)   Wt 282 lb (127.9 kg)   LMP 2020   BMI 42.88 kg/m²   AAOx3, NAD    IMAGING: no scan today       IMPRESSION AND RECOMMENDATIONS:  Kellee Lloyd is a  at 17w4d by known IVF transfer date   Patient Active Problem List    Diagnosis Date Noted    17 weeks gestation of pregnancy 2020    Gestational diabetes mellitus (GDM) in second trimester controlled on oral hypoglycemic drug 10/21/2020     Overview Note:     Consultation today included education on:  Khalif Mcbride MF 10/21/20 with Dr. Vic Hawk    Recommendations:    Continued co-management visits, difficulty with work and wishes to call in the next week. We talked about at least every 2-3 weeks if possible. She is reluctant to keep as many appointments \"this early\" given her work duties and lack of sick time. Will try to work with her schedule.  Comprehensive anatomical fetal survey between 18-20 weeks.  Fetal echocardiogram at 22 weeks.  Serial growth ultrasounds q 4 weeks starting at 24 weeks' gestation.   fetal testing 2 times a week and daily fetal kick counts starting at 32 weeks' gestation.  Check TSH, free T4, urine protein:creatinine if not yet done this pregnancy.  A1C reviewed on 20 4.6    Recommend delivery at 37 weeks gestation (given prior myomectomy), unless earlier delivery is indicated.      Postpartum, recommend 2 hour OGTT to rule out type II DM    Follow up:   Here for 20 visit reviewed diet and sugars and majority of fasting higher than 90 since 10/22 highest at 108, postprandial 50% elevated with each meal, highest 186 most 140-172. Discussed her Metformin at 750mg ER at dinner, carb counting and visits are difficult, was on Insulin last pregnancy and discussed the probable need for this. Recommend BID dosing of Metformin 750mg BID ER,  and she reports possible increased side effects (diarrhea). Will attempt BID with bedtime and am, but feel as though if not helpful will need insulin and this was clearly communicated with her on 11/2/20         Pregnancy with history of uterine myomectomy 10/21/2020     Overview Note:     - The patient had myomectomy through a laparotomy. The myomectomy went through the whole uterine thickness. Recommend   1. Repeat CS at 37 weeks  2. The risk for uterine rupture has been discussed. [facility] Suprvsn of preg rslt from assist reprodctv tech, second tri 10/21/2020     Overview Note:     - The patient had IVF with this pregnancy. Recommendations:  1. Anatomy scan at 18 weeks  2. Fetal echocardiogram at 22 weeks secondary to IVF. 3. US for growth every 4 weeks.  Vitamin D deficiency 10/21/2020     Overview Note:     I recommend vitamin D 2,000 Units daily      Obesity complicating peripregnancy, antepartum, second trimester 10/21/2020     Overview Note:     Dietary consultation  Post delivery weight loss program.        1. She will call with follow up FSBS in one week to our RN  and increase Metformin to BID dosing 750mg BID ER now, and if increased side effects or increasing FSBS despite medications low threshold to start SDI weight based. She is open, but a little frustrated today, which is understandable given her prior history of not being started on something this early. Reassurance given to continue to be compliant with diet and FSBS. 2. Anatomy in the next 2-3 weeks with co-management visit  3. Will need Fetal echo to be ordered between 22-24 weeks GA  4. I confirmed she is taking ASA 81mg daily now.        The total patient time of the visit was 15 minutes, of which was greater than 50% of the time was spent counseling and coordinating care.

## 2020-11-02 NOTE — LETTER
[address] [facility] FETAL MEDICINE  [address] [place] [id number]   Dept: [phone]  Loc: [phone]  Gayathri Perez MD                                           [facility] Consultation Letter     20     Alberto Roldan MD     Re: Patient: Paradise Mayers  YOB: 1985    MR Number: [id number] Date of Visit: 2020     Dear Dr. Susana Glass had the pleasure of seeing your patient, Paradise Mayers, in consultation in the [facility] Fetal Medicine Department of [facility] ([facility]). DIABETES AND PREGNANCY PROGRAM COMANAGEMENT    Referring/Requesting Provider: Alberto Roldan MD   PCP: Farhad Navarro MD    CHIEF COMPLAINT: Allen Brittney vs T2DM given early diagnosis prior to 20 weeks GA   HISTORY OF PRESENT ILLNESS:   Femi Hoskins is a 38 y.o.  at 17w4d with GDMA2 vs T2DM (normal A1C) here for follow up of initial consultation to review FSBS. Taking only daily Metformin 750mg ER dosing. Having a hard time with diet and does not wish to come to as many visits given her lack of sick time and work duties. Blood glucose record was reviewed. Hyperglycemia is present, see below. I discussed my concerns with her current FSBS and the need for increasing dosing of Metformin vs starting insulin and wishes to wait on insulin currently, but understands this is a high possibility given her past pregnancy on insulin. No OB complaints, mostly concerned about early number of visits to our office.   I listened to her concerns today that she voiced         OB History    Para Term  AB Living   3 1 1   1 1   SAB TAB Ectopic Molar Multiple Live Births   1         1      # Outcome Date GA Lbr Armando/2nd Weight Sex Delivery Anes PTL Lv   3 Current            2 Term 19 37w6d  9 lb 10 oz (4.366 kg) M CS-LTranv  N HIREN      Birth Comments:  due to prior fibroid surgery   1 SAB 17 8w0d       FD     a  Past Medical History:   Diagnosis Date    Abnormal Pap smear of cervix     years ago  Anemia     history of    Gestational diabetes mellitus     with first pregnancy    Infertility, female     IVF transfer this pregnancy       Past Surgical History:   Procedure Laterality Date    ABDOMEN SURGERY      fibroids and cysts removed     SECTION N/A 2019     SECTION performed by Cortney Scott MD at [facility] L&D OR    DILATION AND CURETTAGE      DILATION AND CURETTAGE OF UTERUS      DILATION AND CURETTAGE OF UTERUS N/A 2017       Current Outpatient Medications   Medication Sig Dispense Refill    Prenatal Vit-Fe Fumarate-FA (PRENATAL VITAMIN PO) Take 1 tablet by mouth daily      metFORMIN (GLUCOPHAGE-XR) 750 MG extended release tablet Take 750 mg by mouth Daily with supper       aspirin 81 MG EC tablet Take 81 mg by mouth daily       No current facility-administered medications for this visit. No Known Allergies    PHYSICAL EXAM:  VITAL SIGNS: /83   Pulse 105   Temp 97 °F (36.1 °C) (Temporal)   Wt 282 lb (127.9 kg)   LMP 2020   BMI 42.88 kg/m²   AAOx3, NAD    IMAGING: no scan today       IMPRESSION AND RECOMMENDATIONS:  Braden Bauman is a  at 17w4d by known IVF transfer date   Patient Active Problem List    Diagnosis Date Noted    17 weeks gestation of pregnancy 2020    Gestational diabetes mellitus (GDM) in second trimester controlled on oral hypoglycemic drug 10/21/2020     Overview Note:     Consultation today included education on:  ? MFM 10/21/20 with Dr. Nolen Roles    Recommendations:   ? Continued co-management visits, difficulty with work and wishes to call in the next week. We talked about at least every 2-3 weeks if possible. She is reluctant to keep as many appointments \"this early\" given her work duties and lack of sick time. Will try to work with her schedule. ? Comprehensive anatomical fetal survey between 18-20 weeks. ? Fetal echocardiogram at 22 weeks. ? Serial growth ultrasounds q 4 weeks starting at 24 weeks' gestation. ?  fetal testing 2 times a week and daily fetal kick counts starting at 32 weeks' gestation. ? Check TSH, free T4, urine protein:creatinine if not yet done this pregnancy. ? A1C reviewed on 20 4.6   ? Recommend delivery at 37 weeks gestation (given prior myomectomy), unless earlier delivery is indicated. ? Postpartum, recommend 2 hour OGTT to rule out type II DM    Follow up:   Here for 20 visit reviewed diet and sugars and majority of fasting higher than 90 since 10/22 highest at 108, postprandial 50% elevated with each meal, highest 186 most 140-172. Discussed her Metformin at 750mg ER at dinner, carb counting and visits are difficult, was on Insulin last pregnancy and discussed the probable need for this. Recommend BID dosing of Metformin 750mg BID ER,  and she reports possible increased side effects (diarrhea). Will attempt BID with bedtime and am, but feel as though if not helpful will need insulin and this was clearly communicated with her on 20         Pregnancy with history of uterine myomectomy 10/21/2020     Overview Note:     - The patient had myomectomy through a laparotomy. The myomectomy went through the whole uterine thickness. Recommend   1. Repeat CS at 37 weeks  2. The risk for uterine rupture has been discussed. [facility] Suprvsn of preg rslt from assist reprodctv tech, second tri 10/21/2020     Overview Note:     - The patient had IVF with this pregnancy. Recommendations:  1. Anatomy scan at 18 weeks  2. Fetal echocardiogram at 22 weeks secondary to IVF. 3. US for growth every 4 weeks.          Vitamin D deficiency 10/21/2020     Overview Note:     I recommend vitamin D 2,000 Units daily      Obesity complicating peripregnancy, antepartum, second trimester 10/21/2020     Overview Note:     Dietary consultation  Post delivery weight loss program.        1. She will call with follow up FSBS in one week to our RN  and increase Metformin to BID dosing 750mg BID ER now, and if increased side effects or increasing FSBS despite medications low threshold to start SDI weight based. She is open, but a little frustrated today, which is understandable given her prior history of not being started on something this early. Reassurance given to continue to be compliant with diet and FSBS. 2. Anatomy in the next 2-3 weeks with co-management visit  3. Will need Fetal echo to be ordered between 22-24 weeks GA  4. I confirmed she is taking ASA 81mg daily now. The total patient time of the visit was 15 minutes, of which was greater than 50% of the time was spent counseling and coordinating care. Thank you for allowing us to participate in the care of your patient. Should you or the family have any questions or concerns, please do not hesitate to contact us.     Sincerely,    Jay Falk MD

## 2020-11-02 NOTE — PATIENT INSTRUCTIONS
Patient Education        Weeks 14 to 25 of Your Pregnancy: Care Instructions  Your Care Instructions     During this time, you may start to \"show,\" so that you look pregnant to people around you. You may also notice some changes in your skin, such as itchy spots on your palms or acne on your face. Your baby is now able to pass urine, and your baby's first stool (meconium) is starting to collect in his or her intestines. Hair is also beginning to grow on your baby's head. At your next visit, between weeks 18 and 20, your doctor may do an ultrasound test. The test allows your doctor to check for certain problems. Your doctor can also tell the sex of your baby. This is a good time to think about whether you want to know whether your baby is a boy or a girl. Talk to your doctor about getting a flu shot to help keep you healthy during your pregnancy. As your pregnancy moves along, it is common to worry or feel anxious. Your body is changing a lot. And you are thinking about giving birth, the health of your baby, and becoming a parent. You can learn to cope with any anxiety and stress you feel. Follow-up care is a key part of your treatment and safety. Be sure to make and go to all appointments, and call your doctor if you are having problems. It's also a good idea to know your test results and keep a list of the medicines you take. How can you care for yourself at home? Reduce stress    · Ask for help with cooking and housekeeping.     · Figure out who or what causes your stress. Avoid these people or situations as much as possible.     · Relax every day. Taking 10- to 15-minute breaks can make a big difference. Take a walk, listen to music, or take a warm bath.     · Learn relaxation techniques at prenatal or yoga class. Or buy a relaxation tape.     · List your fears about having a baby and becoming a parent. Share the list with someone you trust. Decide which worries are really small, and try to let them go. [employer]. Care instructions adapted under license by [facility] ([facility]). If you have questions about a medical condition or this instruction, always ask your healthcare professional. [address] any warranty or liability for your use of this information. Patient Education        Learning About When to Call Your Doctor During Pregnancy (Up to 20 Weeks)  Your Care Instructions     It's common to have concerns about what might be a problem during pregnancy. Although most pregnant women don't have any serious problems, it's important to know when to call your doctor if you have certain symptoms. These are general suggestions. Your doctor may give you some more information about when to call. When to call your doctor (up to 20 weeks)  Call 911 anytime you think you may need emergency care. For example, call if:  · You passed out (lost consciousness). Call your doctor now or seek immediate medical care if:  · You have a fever. · You have vaginal bleeding. · You are dizzy or lightheaded, or you feel like you may faint. · You have symptoms of a urinary tract infection. These may include:  ? Pain or burning when you urinate. ? A frequent need to urinate without being able to pass much urine. ? Pain in the flank, which is just below the rib cage and above the waist on either side of the back. ? Blood in your urine. · You have belly pain. · You think you are having contractions. · You have a sudden release of fluid from your vagina. Watch closely for changes in your health, and be sure to contact your doctor if:  · You have vaginal discharge that smells bad. · You have other concerns about your pregnancy. Follow-up care is a key part of your treatment and safety. Be sure to make and go to all appointments, and call your doctor if you are having problems. It's also a good idea to know your test results and keep a list of the medicines you take.   Where can you learn more?  Go to [email or web address] and sign in to your [employer] account. Enter A495 in the [facility] box to learn more about \"Learning About When to Call Your Doctor During Pregnancy (Up to 20 Weeks). \"     If you do not have an account, please click on the \"Sign Up Now\" link. Current as of: February 11, 2020               Content Version: 12.6  © 2006-2020 [employer]. Care instructions adapted under license by [facility] ([facility]). If you have questions about a medical condition or this instruction, always ask your healthcare professional. [address] any warranty or liability for your use of this information.

## 2020-11-21 ENCOUNTER — HOSPITAL ENCOUNTER (OUTPATIENT)
Age: 35
Discharge: HOME | End: 2020-11-21
Payer: COMMERCIAL

## 2020-11-23 ENCOUNTER — HOSPITAL ENCOUNTER (OUTPATIENT)
Age: 35
Discharge: HOME | End: 2020-11-23
Payer: COMMERCIAL

## 2020-11-23 LAB
24HR URINE VOLUME (ML): 3875 ML
ALBUMIN SERPL-MCNC: 3.6 G/DL (ref 3.5–5.2)
ALP BLD-CCNC: 87 U/L (ref 35–104)
ALT SERPL-CCNC: 6 U/L (ref 0–32)
ANION GAP SERPL CALCULATED.3IONS-SCNC: 9 MMOL/L (ref 7–16)
AST SERPL-CCNC: 11 U/L (ref 0–31)
BASOPHILS ABSOLUTE: 0.02 E9/L (ref 0–0.2)
BASOPHILS RELATIVE PERCENT: 0.2 % (ref 0–2)
BILIRUB SERPL-MCNC: 0.3 MG/DL (ref 0–1.2)
BUN BLDV-MCNC: 8 MG/DL (ref 6–20)
CALCIUM SERPL-MCNC: 9.3 MG/DL (ref 8.6–10.2)
CHLORIDE BLD-SCNC: 103 MMOL/L (ref 98–107)
CO2: 23 MMOL/L (ref 22–29)
CREAT SERPL-MCNC: 0.5 MG/DL (ref 0.5–1)
CREAT SERPL-MCNC: 0.5 MG/DL (ref 0.5–1)
CREATININE 24 HOUR URINE: 2015 MG/24H (ref 720–1510)
CREATININE CLEARANCE: 279.9 ML/MIN (ref 79–133)
EOSINOPHILS ABSOLUTE: 0.06 E9/L (ref 0.05–0.5)
EOSINOPHILS RELATIVE PERCENT: 0.7 % (ref 0–6)
GFR AFRICAN AMERICAN: >60
GFR NON-AFRICAN AMERICAN: >60 ML/MIN/1.73
GLUCOSE BLD-MCNC: 109 MG/DL (ref 74–99)
HCT VFR BLD CALC: 34.8 % (ref 34–48)
HEMOGLOBIN: 11.3 G/DL (ref 11.5–15.5)
IMMATURE GRANULOCYTES #: 0.04 E9/L
IMMATURE GRANULOCYTES %: 0.5 % (ref 0–5)
LYMPHOCYTES ABSOLUTE: 1.79 E9/L (ref 1.5–4)
LYMPHOCYTES RELATIVE PERCENT: 21.8 % (ref 20–42)
Lab: 24 HOURS
MCH RBC QN AUTO: 30.7 PG (ref 26–35)
MCHC RBC AUTO-ENTMCNC: 32.5 % (ref 32–34.5)
MCV RBC AUTO: 94.6 FL (ref 80–99.9)
MONOCYTES ABSOLUTE: 0.61 E9/L (ref 0.1–0.95)
MONOCYTES RELATIVE PERCENT: 7.4 % (ref 2–12)
NEUTROPHILS ABSOLUTE: 5.7 E9/L (ref 1.8–7.3)
NEUTROPHILS RELATIVE PERCENT: 69.4 % (ref 43–80)
PDW BLD-RTO: 12.9 FL (ref 11.5–15)
PLATELET # BLD: 275 E9/L (ref 130–450)
PMV BLD AUTO: 8.7 FL (ref 7–12)
POTASSIUM SERPL-SCNC: 4.3 MMOL/L (ref 3.5–5)
PROTEIN 24 HOUR URINE: 0.19 G/24HR (ref 0–0.14)
RBC # BLD: 3.68 E12/L (ref 3.5–5.5)
SODIUM BLD-SCNC: 135 MMOL/L (ref 132–146)
T4 FREE: 0.99 NG/DL (ref 0.93–1.7)
TOTAL PROTEIN: 7 G/DL (ref 6.4–8.3)
TSH SERPL DL<=0.05 MIU/L-ACNC: 1.8 UIU/ML (ref 0.27–4.2)
WBC # BLD: 8.2 E9/L (ref 4.5–11.5)

## 2020-11-23 PROCEDURE — 80053 COMPREHEN METABOLIC PANEL: CPT

## 2020-11-23 PROCEDURE — 82575 CREATININE CLEARANCE TEST: CPT

## 2020-11-23 PROCEDURE — 36415 COLL VENOUS BLD VENIPUNCTURE: CPT

## 2020-11-23 PROCEDURE — 84156 ASSAY OF PROTEIN URINE: CPT

## 2020-11-23 PROCEDURE — 84439 ASSAY OF FREE THYROXINE: CPT

## 2020-11-23 PROCEDURE — 85025 COMPLETE CBC W/AUTO DIFF WBC: CPT

## 2020-11-23 PROCEDURE — 84443 ASSAY THYROID STIM HORMONE: CPT

## 2021-02-16 ENCOUNTER — HOSPITAL ENCOUNTER (OUTPATIENT)
Age: 36
Setting detail: OBSERVATION
Discharge: HOME | End: 2021-02-16
Attending: OBSTETRICS & GYNECOLOGY | Admitting: OBSTETRICS & GYNECOLOGY
Payer: COMMERCIAL

## 2021-02-16 ENCOUNTER — APPOINTMENT (OUTPATIENT)
Dept: LABOR AND DELIVERY | Age: 36
End: 2021-02-16
Payer: COMMERCIAL

## 2021-02-16 VITALS
TEMPERATURE: 98.3 F | SYSTOLIC BLOOD PRESSURE: 140 MMHG | HEART RATE: 88 BPM | RESPIRATION RATE: 16 BRPM | DIASTOLIC BLOOD PRESSURE: 71 MMHG

## 2021-02-16 PROBLEM — O26.90 PREGNANCY, COMPLICATED: Status: ACTIVE | Noted: 2021-02-16

## 2021-02-16 LAB
ALBUMIN SERPL-MCNC: 3.4 G/DL (ref 3.5–5.2)
ALP BLD-CCNC: 125 U/L (ref 35–104)
ALT SERPL-CCNC: 9 U/L (ref 0–32)
ANION GAP SERPL CALCULATED.3IONS-SCNC: 11 MMOL/L (ref 7–16)
AST SERPL-CCNC: 10 U/L (ref 0–31)
BASOPHILS ABSOLUTE: 0.02 E9/L (ref 0–0.2)
BASOPHILS RELATIVE PERCENT: 0.2 % (ref 0–2)
BILIRUB SERPL-MCNC: 0.2 MG/DL (ref 0–1.2)
BILIRUBIN URINE: NEGATIVE
BLOOD, URINE: NEGATIVE
BUN BLDV-MCNC: 8 MG/DL (ref 6–20)
CALCIUM SERPL-MCNC: 9.9 MG/DL (ref 8.6–10.2)
CHLORIDE BLD-SCNC: 102 MMOL/L (ref 98–107)
CLARITY: CLEAR
CO2: 23 MMOL/L (ref 22–29)
COLOR: YELLOW
CREAT SERPL-MCNC: 0.6 MG/DL (ref 0.5–1)
EOSINOPHILS ABSOLUTE: 0.06 E9/L (ref 0.05–0.5)
EOSINOPHILS RELATIVE PERCENT: 0.7 % (ref 0–6)
GFR AFRICAN AMERICAN: >60
GFR NON-AFRICAN AMERICAN: >60 ML/MIN/1.73
GLUCOSE BLD-MCNC: 112 MG/DL (ref 74–99)
GLUCOSE URINE: NEGATIVE MG/DL
HCT VFR BLD CALC: 34.2 % (ref 34–48)
HEMOGLOBIN: 10.7 G/DL (ref 11.5–15.5)
IMMATURE GRANULOCYTES #: 0.05 E9/L
IMMATURE GRANULOCYTES %: 0.6 % (ref 0–5)
KETONES, URINE: NEGATIVE MG/DL
LEUKOCYTE ESTERASE, URINE: NEGATIVE
LYMPHOCYTES ABSOLUTE: 1.71 E9/L (ref 1.5–4)
LYMPHOCYTES RELATIVE PERCENT: 20.7 % (ref 20–42)
MCH RBC QN AUTO: 30.3 PG (ref 26–35)
MCHC RBC AUTO-ENTMCNC: 31.3 % (ref 32–34.5)
MCV RBC AUTO: 96.9 FL (ref 80–99.9)
MONOCYTES ABSOLUTE: 0.71 E9/L (ref 0.1–0.95)
MONOCYTES RELATIVE PERCENT: 8.6 % (ref 2–12)
NEUTROPHILS ABSOLUTE: 5.72 E9/L (ref 1.8–7.3)
NEUTROPHILS RELATIVE PERCENT: 69.2 % (ref 43–80)
NITRITE, URINE: NEGATIVE
PDW BLD-RTO: 13.5 FL (ref 11.5–15)
PH UA: 6 (ref 5–9)
PLATELET # BLD: 256 E9/L (ref 130–450)
PMV BLD AUTO: 9.1 FL (ref 7–12)
POTASSIUM SERPL-SCNC: 4.2 MMOL/L (ref 3.5–5)
PROTEIN UA: NEGATIVE MG/DL
RBC # BLD: 3.53 E12/L (ref 3.5–5.5)
SODIUM BLD-SCNC: 136 MMOL/L (ref 132–146)
SPECIFIC GRAVITY UA: 1.01 (ref 1–1.03)
TOTAL PROTEIN: 6.7 G/DL (ref 6.4–8.3)
URIC ACID, SERUM: 5.8 MG/DL (ref 2.4–5.7)
UROBILINOGEN, URINE: 0.2 E.U./DL
WBC # BLD: 8.3 E9/L (ref 4.5–11.5)

## 2021-02-16 PROCEDURE — G0378 HOSPITAL OBSERVATION PER HR: HCPCS

## 2021-02-16 PROCEDURE — 85025 COMPLETE CBC W/AUTO DIFF WBC: CPT

## 2021-02-16 PROCEDURE — 59025 FETAL NON-STRESS TEST: CPT

## 2021-02-16 PROCEDURE — 36415 COLL VENOUS BLD VENIPUNCTURE: CPT

## 2021-02-16 PROCEDURE — 80053 COMPREHEN METABOLIC PANEL: CPT

## 2021-02-16 PROCEDURE — 84550 ASSAY OF BLOOD/URIC ACID: CPT

## 2021-02-16 PROCEDURE — 81003 URINALYSIS AUTO W/O SCOPE: CPT

## 2021-02-16 NOTE — PROGRESS NOTES
32w5d, , JANET 2021 ambulatory to L&D floor for scheduled NST. EFM applied. Maternal perception of fetal movements. Denies any bleeding or leaking of fluids. Call light in reach.

## 2021-02-17 NOTE — PROGRESS NOTES
Dr. Shalini Mahajan updated on lab results, blood pressures, fetal heart rate and overall patient status. Orders for discharge given.

## 2021-02-17 NOTE — PROGRESS NOTES
Discharge instructions given to patient and all questions answered. Patient understands importance of follow up appointment in the office and going to NST on Friday. Patient verbalizes understanding. Ambulated off unit.

## 2021-02-19 ENCOUNTER — APPOINTMENT (OUTPATIENT)
Dept: LABOR AND DELIVERY | Age: 36
End: 2021-02-19
Payer: COMMERCIAL

## 2021-02-19 ENCOUNTER — HOSPITAL ENCOUNTER (OUTPATIENT)
Age: 36
Discharge: HOME | End: 2021-02-19
Attending: OBSTETRICS & GYNECOLOGY | Admitting: OBSTETRICS & GYNECOLOGY
Payer: COMMERCIAL

## 2021-02-19 VITALS
HEART RATE: 105 BPM | TEMPERATURE: 98.3 F | DIASTOLIC BLOOD PRESSURE: 70 MMHG | SYSTOLIC BLOOD PRESSURE: 137 MMHG | RESPIRATION RATE: 18 BRPM

## 2021-02-19 PROCEDURE — 59025 FETAL NON-STRESS TEST: CPT

## 2021-02-20 NOTE — PROGRESS NOTES
Updated Dr. Nissa Cochran of vitals and fetal heart tones and uterine activity. Patient okay for discharge home.

## 2021-03-11 ENCOUNTER — HOSPITAL ENCOUNTER (OUTPATIENT)
Age: 36
Setting detail: OBSERVATION
Discharge: HOME | End: 2021-03-11
Attending: SPECIALIST | Admitting: SPECIALIST
Payer: COMMERCIAL

## 2021-03-11 VITALS
RESPIRATION RATE: 20 BRPM | HEART RATE: 100 BPM | HEIGHT: 68 IN | DIASTOLIC BLOOD PRESSURE: 67 MMHG | WEIGHT: 293 LBS | SYSTOLIC BLOOD PRESSURE: 134 MMHG | TEMPERATURE: 97.9 F | BODY MASS INDEX: 44.41 KG/M2

## 2021-03-11 DIAGNOSIS — Z01.818 PREOP TESTING: Primary | ICD-10-CM

## 2021-03-11 PROBLEM — Z3A.39 PREGNANCY WITH 39 COMPLETED WEEKS GESTATION: Status: ACTIVE | Noted: 2021-03-11

## 2021-03-11 PROBLEM — O24.414 INSULIN CONTROLLED GESTATIONAL DIABETES MELLITUS (GDM) IN THIRD TRIMESTER: Status: ACTIVE | Noted: 2020-10-21

## 2021-03-11 LAB
ALBUMIN SERPL-MCNC: 3.2 G/DL (ref 3.5–5.2)
ALP BLD-CCNC: 144 U/L (ref 35–104)
ALT SERPL-CCNC: 8 U/L (ref 0–32)
ANION GAP SERPL CALCULATED.3IONS-SCNC: 13 MMOL/L (ref 7–16)
AST SERPL-CCNC: 12 U/L (ref 0–31)
BILIRUB SERPL-MCNC: 0.4 MG/DL (ref 0–1.2)
BILIRUBIN URINE: NEGATIVE
BLOOD, URINE: NEGATIVE
BUN BLDV-MCNC: 5 MG/DL (ref 6–20)
CALCIUM SERPL-MCNC: 9 MG/DL (ref 8.6–10.2)
CHLORIDE BLD-SCNC: 103 MMOL/L (ref 98–107)
CLARITY: CLEAR
CO2: 20 MMOL/L (ref 22–29)
COLOR: YELLOW
CREAT SERPL-MCNC: 0.4 MG/DL (ref 0.5–1)
CREATININE URINE: 90 MG/DL (ref 29–226)
GFR AFRICAN AMERICAN: >60
GFR NON-AFRICAN AMERICAN: >60 ML/MIN/1.73
GLUCOSE BLD-MCNC: 158 MG/DL (ref 74–99)
GLUCOSE URINE: NEGATIVE MG/DL
HCT VFR BLD CALC: 32.9 % (ref 34–48)
HEMOGLOBIN: 10.1 G/DL (ref 11.5–15.5)
KETONES, URINE: NEGATIVE MG/DL
LEUKOCYTE ESTERASE, URINE: NEGATIVE
MCH RBC QN AUTO: 29.4 PG (ref 26–35)
MCHC RBC AUTO-ENTMCNC: 30.7 % (ref 32–34.5)
MCV RBC AUTO: 95.6 FL (ref 80–99.9)
NITRITE, URINE: NEGATIVE
PDW BLD-RTO: 13.8 FL (ref 11.5–15)
PH UA: 6 (ref 5–9)
PLATELET # BLD: 243 E9/L (ref 130–450)
PMV BLD AUTO: 9 FL (ref 7–12)
POTASSIUM SERPL-SCNC: 3.7 MMOL/L (ref 3.5–5)
PROTEIN PROTEIN: 22 MG/DL (ref 0–12)
PROTEIN UA: NEGATIVE MG/DL
PROTEIN/CREAT RATIO: 0.2
PROTEIN/CREAT RATIO: 0.2 (ref 0–0.2)
RBC # BLD: 3.44 E12/L (ref 3.5–5.5)
SODIUM BLD-SCNC: 136 MMOL/L (ref 132–146)
SPECIFIC GRAVITY UA: 1.02 (ref 1–1.03)
TOTAL PROTEIN: 6.4 G/DL (ref 6.4–8.3)
URIC ACID, SERUM: 5.6 MG/DL (ref 2.4–5.7)
UROBILINOGEN, URINE: 0.2 E.U./DL
WBC # BLD: 7.9 E9/L (ref 4.5–11.5)

## 2021-03-11 PROCEDURE — G0378 HOSPITAL OBSERVATION PER HR: HCPCS

## 2021-03-11 PROCEDURE — 84156 ASSAY OF PROTEIN URINE: CPT

## 2021-03-11 PROCEDURE — 85027 COMPLETE CBC AUTOMATED: CPT

## 2021-03-11 PROCEDURE — 80053 COMPREHEN METABOLIC PANEL: CPT

## 2021-03-11 PROCEDURE — 99219 PR INITIAL OBSERVATION CARE/DAY 50 MINUTES: CPT | Performed by: ADVANCED PRACTICE MIDWIFE

## 2021-03-11 PROCEDURE — 82570 ASSAY OF URINE CREATININE: CPT

## 2021-03-11 PROCEDURE — 81003 URINALYSIS AUTO W/O SCOPE: CPT

## 2021-03-11 PROCEDURE — 84550 ASSAY OF BLOOD/URIC ACID: CPT

## 2021-03-11 PROCEDURE — 59025 FETAL NON-STRESS TEST: CPT

## 2021-03-11 PROCEDURE — 36415 COLL VENOUS BLD VENIPUNCTURE: CPT

## 2021-03-11 RX ORDER — MULTIVIT-MIN/IRON/FOLIC ACID/K 18-600-40
CAPSULE ORAL
COMMUNITY

## 2021-03-11 RX ORDER — SODIUM CHLORIDE 0.9 % (FLUSH) 0.9 %
10 SYRINGE (ML) INJECTION EVERY 12 HOURS SCHEDULED
Status: DISCONTINUED | OUTPATIENT
Start: 2021-03-11 | End: 2021-03-11 | Stop reason: HOSPADM

## 2021-03-11 RX ORDER — SODIUM CHLORIDE 0.9 % (FLUSH) 0.9 %
10 SYRINGE (ML) INJECTION PRN
Status: DISCONTINUED | OUTPATIENT
Start: 2021-03-11 | End: 2021-03-11 | Stop reason: HOSPADM

## 2021-03-11 NOTE — PROGRESS NOTES
Discharge instructions given to patient at this time. Patient verbalized understanding. Patient ambulatory at discharge and discharged home in stable condition and undelivered.

## 2021-03-11 NOTE — H&P
MYOMECTOMY       Social History:    TOBACCO:   reports that she has never smoked. She has never used smokeless tobacco.  ETOH:   reports previous alcohol use. DRUGS:   reports no history of drug use. Family History:       Problem Relation Age of Onset    High Blood Pressure Father     Asthma Brother     Cancer Paternal Aunt      Medications Prior to Admission:  No medications prior to admission. Allergies:  Patient has no known allergies. Review of Systems:   Ears, nose, mouth, throat, and face: negative  Respiratory: negative  Cardiovascular: negative  Gastrointestinal: negative  Genitourinary:negative  Integument/breast: negative  Hematologic/lymphatic: negative  Musculoskeletal:negative  Neurological: negative  Behavioral/Psych: negative  Endocrine: negative  Allergic/Immunologic: negative  Psychosocial: negative    PHYSICAL EXAM:    General appearance:  awake, alert, cooperative, no apparent distress, and appears stated age  Neurologic:  Awake, alert, oriented to name, place and time. Cranial nerves II-XII are grossly intact. and gait is normal.  Lungs:  No increased work of breathing  Heart:  regular rate and rhythm   Abdomen:  Gravid with normal bowel sounds, soft, non-distended, non-tender, no masses palpated  Pelvis:  Deferrred    Fetal heart rate: 135 BPM MV Accelerations Preset Decelerations Absent   Cervix:  Deferred    Contraction frequency:  None   Membranes:  Intact    /67   Pulse 100   Temp 97.9 °F (36.6 °C) (Oral)   Resp 20   Ht 5' 8\" (1.727 m)   Wt (!) 310 lb (140.6 kg)   LMP 2020   BMI 47.14 kg/m²         ASSESSMENT AND PLAN:  IUP 36    FHR category 1   Macrosomia   GDM insulin dependant  Hx Previous  birth   Hx Myomectomy   Fibroid   IVF  AMA    Observation   EFM    Electronically signed by TREVIN Churchill CNM on 3/11/2021 at 3:32 PM

## 2021-03-12 ENCOUNTER — HOSPITAL ENCOUNTER (OUTPATIENT)
Age: 36
Discharge: HOME | End: 2021-03-12
Attending: SPECIALIST | Admitting: SPECIALIST
Payer: COMMERCIAL

## 2021-03-12 VITALS — DIASTOLIC BLOOD PRESSURE: 72 MMHG | SYSTOLIC BLOOD PRESSURE: 141 MMHG | RESPIRATION RATE: 16 BRPM | HEART RATE: 100 BPM

## 2021-03-12 PROCEDURE — 59025 FETAL NON-STRESS TEST: CPT

## 2021-03-12 NOTE — PROGRESS NOTES
Patient presents from physician office with co decreased fetal movement. Denies bleeding or leaking fluid. Will do NST.

## 2021-03-12 NOTE — PROGRESS NOTES
Notified Dr. Lesli Carlisle of NST tracing and patient perceives fetal movement. Will discharge to home.

## 2021-03-19 ENCOUNTER — HOSPITAL ENCOUNTER (OUTPATIENT)
Age: 36
Discharge: HOME | End: 2021-03-21
Payer: COMMERCIAL

## 2021-03-19 DIAGNOSIS — Z01.818 PREOP TESTING: ICD-10-CM

## 2021-03-19 PROCEDURE — U0003 INFECTIOUS AGENT DETECTION BY NUCLEIC ACID (DNA OR RNA); SEVERE ACUTE RESPIRATORY SYNDROME CORONAVIRUS 2 (SARS-COV-2) (CORONAVIRUS DISEASE [COVID-19]), AMPLIFIED PROBE TECHNIQUE, MAKING USE OF HIGH THROUGHPUT TECHNOLOGIES AS DESCRIBED BY CMS-2020-01-R: HCPCS

## 2021-03-20 LAB
SARS-COV-2: NOT DETECTED
SOURCE: NORMAL

## 2021-03-24 ENCOUNTER — ANESTHESIA EVENT (OUTPATIENT)
Dept: LABOR AND DELIVERY | Age: 36
End: 2021-03-24
Payer: COMMERCIAL

## 2021-03-25 ENCOUNTER — ANESTHESIA (OUTPATIENT)
Dept: LABOR AND DELIVERY | Age: 36
End: 2021-03-25
Payer: COMMERCIAL

## 2021-03-25 ENCOUNTER — HOSPITAL ENCOUNTER (INPATIENT)
Age: 36
LOS: 2 days | Discharge: HOME | End: 2021-03-27
Attending: SPECIALIST | Admitting: SPECIALIST
Payer: COMMERCIAL

## 2021-03-25 VITALS
RESPIRATION RATE: 18 BRPM | TEMPERATURE: 97 F | OXYGEN SATURATION: 99 % | DIASTOLIC BLOOD PRESSURE: 81 MMHG | SYSTOLIC BLOOD PRESSURE: 126 MMHG

## 2021-03-25 PROBLEM — Z3A.38 38 WEEKS GESTATION OF PREGNANCY: Status: ACTIVE | Noted: 2021-03-25

## 2021-03-25 LAB
ABO/RH: NORMAL
ALBUMIN SERPL-MCNC: 3.3 G/DL (ref 3.5–5.2)
ALP BLD-CCNC: 180 U/L (ref 35–104)
ALT SERPL-CCNC: 8 U/L (ref 0–32)
AMPHETAMINE SCREEN, URINE: NOT DETECTED
ANION GAP SERPL CALCULATED.3IONS-SCNC: 14 MMOL/L (ref 7–16)
ANTIBODY SCREEN: NORMAL
AST SERPL-CCNC: 13 U/L (ref 0–31)
BARBITURATE SCREEN URINE: NOT DETECTED
BENZODIAZEPINE SCREEN, URINE: NOT DETECTED
BILIRUB SERPL-MCNC: 0.4 MG/DL (ref 0–1.2)
BUN BLDV-MCNC: 5 MG/DL (ref 6–20)
CALCIUM SERPL-MCNC: 9.1 MG/DL (ref 8.6–10.2)
CANNABINOID SCREEN URINE: NOT DETECTED
CHLORIDE BLD-SCNC: 102 MMOL/L (ref 98–107)
CO2: 21 MMOL/L (ref 22–29)
COCAINE METABOLITE SCREEN URINE: NOT DETECTED
CREAT SERPL-MCNC: 0.5 MG/DL (ref 0.5–1)
FENTANYL SCREEN, URINE: NOT DETECTED
GFR AFRICAN AMERICAN: >60
GFR NON-AFRICAN AMERICAN: >60 ML/MIN/1.73
GLUCOSE BLD-MCNC: 88 MG/DL (ref 74–99)
HCT VFR BLD CALC: 32.3 % (ref 34–48)
HEMOGLOBIN: 9.9 G/DL (ref 11.5–15.5)
Lab: NORMAL
MCH RBC QN AUTO: 29.2 PG (ref 26–35)
MCHC RBC AUTO-ENTMCNC: 30.7 % (ref 32–34.5)
MCV RBC AUTO: 95.3 FL (ref 80–99.9)
METHADONE SCREEN, URINE: NOT DETECTED
OPIATE SCREEN URINE: NOT DETECTED
OXYCODONE URINE: NOT DETECTED
PDW BLD-RTO: 14.1 FL (ref 11.5–15)
PHENCYCLIDINE SCREEN URINE: NOT DETECTED
PLATELET # BLD: 232 E9/L (ref 130–450)
PMV BLD AUTO: 9.6 FL (ref 7–12)
POTASSIUM SERPL-SCNC: 4.1 MMOL/L (ref 3.5–5)
RBC # BLD: 3.39 E12/L (ref 3.5–5.5)
SODIUM BLD-SCNC: 137 MMOL/L (ref 132–146)
TOTAL PROTEIN: 6.6 G/DL (ref 6.4–8.3)
WBC # BLD: 8.8 E9/L (ref 4.5–11.5)

## 2021-03-25 PROCEDURE — 3700000001 HC ADD 15 MINUTES (ANESTHESIA): Performed by: SPECIALIST

## 2021-03-25 PROCEDURE — 51702 INSERT TEMP BLADDER CATH: CPT

## 2021-03-25 PROCEDURE — 2580000003 HC RX 258: Performed by: SPECIALIST

## 2021-03-25 PROCEDURE — 80307 DRUG TEST PRSMV CHEM ANLYZR: CPT

## 2021-03-25 PROCEDURE — 6360000002 HC RX W HCPCS: Performed by: NURSE ANESTHETIST, CERTIFIED REGISTERED

## 2021-03-25 PROCEDURE — 80053 COMPREHEN METABOLIC PANEL: CPT

## 2021-03-25 PROCEDURE — 2709999900 HC NON-CHARGEABLE SUPPLY: Performed by: SPECIALIST

## 2021-03-25 PROCEDURE — 59514 CESAREAN DELIVERY ONLY: CPT | Performed by: ADVANCED PRACTICE MIDWIFE

## 2021-03-25 PROCEDURE — 36415 COLL VENOUS BLD VENIPUNCTURE: CPT

## 2021-03-25 PROCEDURE — 6360000002 HC RX W HCPCS: Performed by: ANESTHESIOLOGY

## 2021-03-25 PROCEDURE — 7100000001 HC PACU RECOVERY - ADDTL 15 MIN: Performed by: SPECIALIST

## 2021-03-25 PROCEDURE — 3609079900 HC CESAREAN SECTION: Performed by: SPECIALIST

## 2021-03-25 PROCEDURE — 85027 COMPLETE CBC AUTOMATED: CPT

## 2021-03-25 PROCEDURE — 7100000000 HC PACU RECOVERY - FIRST 15 MIN: Performed by: SPECIALIST

## 2021-03-25 PROCEDURE — 2500000003 HC RX 250 WO HCPCS: Performed by: NURSE ANESTHETIST, CERTIFIED REGISTERED

## 2021-03-25 PROCEDURE — 6360000002 HC RX W HCPCS: Performed by: SPECIALIST

## 2021-03-25 PROCEDURE — 6370000000 HC RX 637 (ALT 250 FOR IP): Performed by: SPECIALIST

## 2021-03-25 PROCEDURE — 6360000002 HC RX W HCPCS

## 2021-03-25 PROCEDURE — 1220000001 HC SEMI PRIVATE L&D R&B

## 2021-03-25 PROCEDURE — 3700000000 HC ANESTHESIA ATTENDED CARE: Performed by: SPECIALIST

## 2021-03-25 PROCEDURE — 86900 BLOOD TYPING SEROLOGIC ABO: CPT

## 2021-03-25 PROCEDURE — 86901 BLOOD TYPING SEROLOGIC RH(D): CPT

## 2021-03-25 PROCEDURE — 86850 RBC ANTIBODY SCREEN: CPT

## 2021-03-25 RX ORDER — IBUPROFEN 600 MG/1
600 TABLET ORAL EVERY 6 HOURS PRN
Status: DISCONTINUED | OUTPATIENT
Start: 2021-03-25 | End: 2021-03-27 | Stop reason: HOSPADM

## 2021-03-25 RX ORDER — SODIUM CHLORIDE, SODIUM LACTATE, POTASSIUM CHLORIDE, CALCIUM CHLORIDE 600; 310; 30; 20 MG/100ML; MG/100ML; MG/100ML; MG/100ML
INJECTION, SOLUTION INTRAVENOUS CONTINUOUS
Status: ACTIVE | OUTPATIENT
Start: 2021-03-25 | End: 2021-03-26

## 2021-03-25 RX ORDER — ACETAMINOPHEN 325 MG/1
650 TABLET ORAL EVERY 4 HOURS PRN
Status: DISCONTINUED | OUTPATIENT
Start: 2021-03-25 | End: 2021-03-27 | Stop reason: HOSPADM

## 2021-03-25 RX ORDER — SODIUM CHLORIDE 0.9 % (FLUSH) 0.9 %
10 SYRINGE (ML) INJECTION EVERY 12 HOURS SCHEDULED
Status: DISCONTINUED | OUTPATIENT
Start: 2021-03-25 | End: 2021-03-27 | Stop reason: HOSPADM

## 2021-03-25 RX ORDER — SODIUM CHLORIDE 0.9 % (FLUSH) 0.9 %
10 SYRINGE (ML) INJECTION PRN
Status: DISCONTINUED | OUTPATIENT
Start: 2021-03-25 | End: 2021-03-27 | Stop reason: HOSPADM

## 2021-03-25 RX ORDER — KETOROLAC TROMETHAMINE 30 MG/ML
30 INJECTION, SOLUTION INTRAMUSCULAR; INTRAVENOUS EVERY 6 HOURS
Status: DISPENSED | OUTPATIENT
Start: 2021-03-25 | End: 2021-03-26

## 2021-03-25 RX ORDER — DOCUSATE SODIUM 100 MG/1
100 CAPSULE, LIQUID FILLED ORAL 2 TIMES DAILY
Status: DISCONTINUED | OUTPATIENT
Start: 2021-03-25 | End: 2021-03-27 | Stop reason: HOSPADM

## 2021-03-25 RX ORDER — METHYLERGONOVINE MALEATE 0.2 MG/ML
200 INJECTION INTRAVENOUS PRN
Status: DISCONTINUED | OUTPATIENT
Start: 2021-03-25 | End: 2021-03-27 | Stop reason: HOSPADM

## 2021-03-25 RX ORDER — MODIFIED LANOLIN
OINTMENT (GRAM) TOPICAL
Status: DISCONTINUED | OUTPATIENT
Start: 2021-03-25 | End: 2021-03-27 | Stop reason: HOSPADM

## 2021-03-25 RX ORDER — PROCHLORPERAZINE EDISYLATE 5 MG/ML
10 INJECTION INTRAMUSCULAR; INTRAVENOUS EVERY 6 HOURS PRN
Status: DISCONTINUED | OUTPATIENT
Start: 2021-03-25 | End: 2021-03-27 | Stop reason: HOSPADM

## 2021-03-25 RX ORDER — ONDANSETRON 2 MG/ML
INJECTION INTRAMUSCULAR; INTRAVENOUS
Status: COMPLETED
Start: 2021-03-25 | End: 2021-03-25

## 2021-03-25 RX ORDER — SODIUM CHLORIDE, SODIUM LACTATE, POTASSIUM CHLORIDE, AND CALCIUM CHLORIDE .6; .31; .03; .02 G/100ML; G/100ML; G/100ML; G/100ML
1000 INJECTION, SOLUTION INTRAVENOUS ONCE
Status: COMPLETED | OUTPATIENT
Start: 2021-03-25 | End: 2021-03-26

## 2021-03-25 RX ORDER — OXYCODONE HYDROCHLORIDE AND ACETAMINOPHEN 5; 325 MG/1; MG/1
1 TABLET ORAL EVERY 4 HOURS PRN
Status: DISCONTINUED | OUTPATIENT
Start: 2021-03-25 | End: 2021-03-27 | Stop reason: HOSPADM

## 2021-03-25 RX ORDER — TRISODIUM CITRATE DIHYDRATE AND CITRIC ACID MONOHYDRATE 500; 334 MG/5ML; MG/5ML
30 SOLUTION ORAL ONCE
Status: COMPLETED | OUTPATIENT
Start: 2021-03-25 | End: 2021-03-25

## 2021-03-25 RX ORDER — ONDANSETRON 2 MG/ML
INJECTION INTRAMUSCULAR; INTRAVENOUS PRN
Status: DISCONTINUED | OUTPATIENT
Start: 2021-03-25 | End: 2021-03-25 | Stop reason: SDUPTHER

## 2021-03-25 RX ORDER — MISOPROSTOL 200 UG/1
800 TABLET ORAL PRN
Status: DISCONTINUED | OUTPATIENT
Start: 2021-03-25 | End: 2021-03-27 | Stop reason: HOSPADM

## 2021-03-25 RX ORDER — MORPHINE SULFATE 1 MG/ML
INJECTION, SOLUTION EPIDURAL; INTRATHECAL; INTRAVENOUS PRN
Status: DISCONTINUED | OUTPATIENT
Start: 2021-03-25 | End: 2021-03-25 | Stop reason: SDUPTHER

## 2021-03-25 RX ORDER — OXYCODONE HYDROCHLORIDE AND ACETAMINOPHEN 5; 325 MG/1; MG/1
2 TABLET ORAL EVERY 4 HOURS PRN
Status: DISCONTINUED | OUTPATIENT
Start: 2021-03-25 | End: 2021-03-27 | Stop reason: HOSPADM

## 2021-03-25 RX ORDER — SODIUM CHLORIDE, SODIUM LACTATE, POTASSIUM CHLORIDE, CALCIUM CHLORIDE 600; 310; 30; 20 MG/100ML; MG/100ML; MG/100ML; MG/100ML
INJECTION, SOLUTION INTRAVENOUS CONTINUOUS
Status: DISCONTINUED | OUTPATIENT
Start: 2021-03-25 | End: 2021-03-27 | Stop reason: HOSPADM

## 2021-03-25 RX ORDER — SODIUM CHLORIDE, SODIUM LACTATE, POTASSIUM CHLORIDE, AND CALCIUM CHLORIDE .6; .31; .03; .02 G/100ML; G/100ML; G/100ML; G/100ML
250 INJECTION, SOLUTION INTRAVENOUS ONCE
Status: DISCONTINUED | OUTPATIENT
Start: 2021-03-25 | End: 2021-03-27 | Stop reason: HOSPADM

## 2021-03-25 RX ORDER — BUPIVACAINE HYDROCHLORIDE 7.5 MG/ML
INJECTION, SOLUTION INTRASPINAL PRN
Status: DISCONTINUED | OUTPATIENT
Start: 2021-03-25 | End: 2021-03-25 | Stop reason: SDUPTHER

## 2021-03-25 RX ADMIN — ONDANSETRON 4 MG: 2 INJECTION INTRAMUSCULAR; INTRAVENOUS at 14:40

## 2021-03-25 RX ADMIN — SODIUM CHLORIDE, POTASSIUM CHLORIDE, SODIUM LACTATE AND CALCIUM CHLORIDE: 600; 310; 30; 20 INJECTION, SOLUTION INTRAVENOUS at 21:18

## 2021-03-25 RX ADMIN — SODIUM CHLORIDE, POTASSIUM CHLORIDE, SODIUM LACTATE AND CALCIUM CHLORIDE: 600; 310; 30; 20 INJECTION, SOLUTION INTRAVENOUS at 11:28

## 2021-03-25 RX ADMIN — PHENYLEPHRINE HYDROCHLORIDE 100 MCG: 10 INJECTION INTRAVENOUS at 12:32

## 2021-03-25 RX ADMIN — MORPHINE SULFATE 0.15 MG: 1 INJECTION, SOLUTION EPIDURAL; INTRATHECAL; INTRAVENOUS at 12:15

## 2021-03-25 RX ADMIN — CEFAZOLIN 3000 MG: 10 INJECTION, POWDER, FOR SOLUTION INTRAVENOUS at 11:45

## 2021-03-25 RX ADMIN — PHENYLEPHRINE HYDROCHLORIDE 200 MCG: 10 INJECTION INTRAVENOUS at 12:44

## 2021-03-25 RX ADMIN — PROCHLORPERAZINE EDISYLATE 10 MG: 5 INJECTION INTRAMUSCULAR; INTRAVENOUS at 15:30

## 2021-03-25 RX ADMIN — KETOROLAC TROMETHAMINE 30 MG: 30 INJECTION, SOLUTION INTRAMUSCULAR at 21:13

## 2021-03-25 RX ADMIN — SODIUM CHLORIDE, POTASSIUM CHLORIDE, SODIUM LACTATE AND CALCIUM CHLORIDE: 600; 310; 30; 20 INJECTION, SOLUTION INTRAVENOUS at 12:04

## 2021-03-25 RX ADMIN — DOCUSATE SODIUM 100 MG: 100 CAPSULE, LIQUID FILLED ORAL at 20:59

## 2021-03-25 RX ADMIN — SODIUM CITRATE AND CITRIC ACID MONOHYDRATE 30 ML: 500; 334 SOLUTION ORAL at 11:50

## 2021-03-25 RX ADMIN — SODIUM CHLORIDE, POTASSIUM CHLORIDE, SODIUM LACTATE AND CALCIUM CHLORIDE: 600; 310; 30; 20 INJECTION, SOLUTION INTRAVENOUS at 12:55

## 2021-03-25 RX ADMIN — PHENYLEPHRINE HYDROCHLORIDE 200 MCG: 10 INJECTION INTRAVENOUS at 12:20

## 2021-03-25 RX ADMIN — ONDANSETRON 4 MG: 2 INJECTION INTRAMUSCULAR; INTRAVENOUS at 12:28

## 2021-03-25 RX ADMIN — PHENYLEPHRINE HYDROCHLORIDE 300 MCG: 10 INJECTION INTRAVENOUS at 12:39

## 2021-03-25 RX ADMIN — BUPIVACAINE HYDROCHLORIDE IN DEXTROSE 1.8 ML: 7.5 INJECTION, SOLUTION SUBARACHNOID at 12:15

## 2021-03-25 RX ADMIN — PHENYLEPHRINE HYDROCHLORIDE 100 MCG: 10 INJECTION INTRAVENOUS at 12:17

## 2021-03-25 RX ADMIN — KETOROLAC TROMETHAMINE 30 MG: 30 INJECTION, SOLUTION INTRAMUSCULAR at 15:00

## 2021-03-25 RX ADMIN — SODIUM CHLORIDE, POTASSIUM CHLORIDE, SODIUM LACTATE AND CALCIUM CHLORIDE: 600; 310; 30; 20 INJECTION, SOLUTION INTRAVENOUS at 10:30

## 2021-03-25 RX ADMIN — Medication 999 ML/HR: at 12:27

## 2021-03-25 ASSESSMENT — PULMONARY FUNCTION TESTS
PIF_VALUE: 0

## 2021-03-25 ASSESSMENT — PAIN - FUNCTIONAL ASSESSMENT: PAIN_FUNCTIONAL_ASSESSMENT: PREVENTS OR INTERFERES SOME ACTIVE ACTIVITIES AND ADLS

## 2021-03-25 ASSESSMENT — PAIN SCALES - GENERAL
PAINLEVEL_OUTOF10: 7
PAINLEVEL_OUTOF10: 0

## 2021-03-25 NOTE — PROGRESS NOTES
IVF's started with IV bolus initiated pre-op for . Antiseptic nasal swabs given to patient with pre-op prophylaxis completed. Chlorhexidine wipes to upper body, arms and abdomen completed pre-op for surgery.

## 2021-03-25 NOTE — PROGRESS NOTES
Abdominal shave prep completed. Holloway catheter inserted and patent at bedside draining clear yellow,diluted urine.

## 2021-03-25 NOTE — PROGRESS NOTES
from Anesthesia at bedside to see patient pre-op with plan of care discussed with patient and -agreeable to plan of care.

## 2021-03-25 NOTE — H&P
Department of Obstetrics and Gynecology   [facility] History and Physical        CHIEF COMPLAINT:  Repeat scheduled  birth     HISTORY OF PRESENT ILLNESS:      The patient is a 38 y.o. female , Patient's last menstrual period was 2020.,  at 38w0d. OB History        3    Para   1    Term   1            AB   1    Living   1       SAB   1    TAB        Ectopic        Molar        Multiple        Live Births   1            Patient presents for a scheduled repeat low transverse caesarean section r/t insulin dependant GDM and previous uterine scar.     Estimated Due Date: Estimated Date of Delivery: 21    PRENATAL CARE:    Complicated by:   Patient Active Problem List   Diagnosis Code    Insulin controlled gestational diabetes mellitus (GDM) in third trimester O24.414    Pregnancy with history of uterine myomectomy O34.29    Suprvsn of preg rslt from assist reprodctv tech, second tri O09.812    Vitamin D deficiency E55.9    Obesity complicating peripregnancy, antepartum, second trimester O99.212    17 weeks gestation of pregnancy Z3A.17    Pregnancy, complicated E27.94    Pregnancy with 44 completed weeks gestation Z3A.39    Preop testing Z01.818    In vitro fertilization Z31.83    Macrosomia P08.0    History of uterine scar from previous surgery Z98.891    38 weeks gestation of pregnancy Z3A.38       PAST OB HISTORY  OB History        3    Para   1    Term   1            AB   1    Living   1       SAB   1    TAB        Ectopic        Molar        Multiple        Live Births   1                Past Medical History:        Diagnosis Date    Abnormal Pap smear of cervix     years ago    Anemia     history of    Fibroid tumor     Gestational diabetes mellitus     with first pregnancy    Infertility, female     IVF transfer this pregnancy     Past Surgical History:        Procedure Laterality Date    ABDOMEN SURGERY      fibroids and cysts removed     SECTION N/A 2019     SECTION performed by [facility]an [facility] at [facility] L&D OR    DILATION AND CURETTAGE      DILATION AND CURETTAGE OF UTERUS      DILATION AND CURETTAGE OF UTERUS N/A 2017    MYOMECTOMY       Social History:    TOBACCO:   reports that she has never smoked. She has never used smokeless tobacco.  ETOH:   reports previous alcohol use. DRUGS:   reports no history of drug use. Family History:       Problem Relation Age of Onset    High Blood Pressure Father     Asthma Brother     Cancer Paternal Aunt      Medications Prior to Admission:  Medications Prior to Admission: metFORMIN (GLUCOPHAGE) 1000 MG tablet, Take 1,000 mg by mouth daily  Cholecalciferol (VITAMIN D) 50 MCG (2000) CAPS capsule, Take by mouth  insulin NPH (HUMULIN N;NOVOLIN N) 100 UNIT/ML injection vial, Inject 30 Units into the skin every morning  insulin NPH (HUMULIN N;NOVOLIN N) 100 UNIT/ML injection vial, Inject 12 Units into the skin nightly  insulin regular (HUMULIN R;NOVOLIN R) 100 UNIT/ML injection, Inject into the skin See Admin Instructions 15 Units in the AM and 6 units at night  Prenatal Vit-Fe Fumarate-FA (PRENATAL VITAMIN PO), Take 1 tablet by mouth daily  aspirin 81 MG EC tablet, Take 81 mg by mouth daily  Allergies:  Patient has no known allergies. Review of Systems:   Ears, nose, mouth, throat, and face: negative  Respiratory: negative  Cardiovascular: negative  Gastrointestinal: negative  Genitourinary:negative  Integument/breast: negative  Hematologic/lymphatic: negative  Musculoskeletal:negative  Neurological: negative  Behavioral/Psych: negative  Endocrine: negative  Allergic/Immunologic: negative  Psychosocial: negative    PHYSICAL EXAM:    General appearance:  awake, alert, cooperative, no apparent distress, and appears stated age  Neurologic:  Awake, alert, oriented to name, place and time. Cranial nerves II-XII are grossly intact. and gait is normal.  Lungs:  No increased work of breathing, good air exchange  Heart:  regular rate and rhythm   Abdomen:  Gravid with normal bowel sounds, soft, non-distended, non-tender, no masses palpated  Pelvis:  External Genitalia: General appearance; normal, Hair distribution; normal, Lesions absent.     Fetal heart rate Category 1  Cervix:  deferred    Contraction frequency:    Membranes:  Intact    LMP 2020     General Labs:    Lab Results   Component Value Date    WBC 8.8 2021    HGB 9.9 (L) 2021    HCT 32.3 (L) 2021    MCV 95.3 2021     2021      Lab Results   Component Value Date     2021    K 4.1 2021     2021    CO2 21 (L) 2021    BUN 5 (L) 2021    CREATININE 0.5 2021    GLUCOSE 88 2021    CALCIUM 9.1 2021    PROT 6.6 2021    LABALBU 3.3 (L) 2021    BILITOT 0.4 2021    ALKPHOS 180 (H) 2021    AST 13 2021    ALT 8 2021    LABGLOM >60 2021    GFRAA >60 2021       ASSESSMENT AND PLAN:  IUP 38    FHR category 1   Macrosomia   GDM insulin dependant  Hx Previous  birth   Hx Myomectomy   Fibroid   IVF  AMA  GBS   Anemia        Admit   LTCS Repeat     Electronically signed by TREVIN Spears CNM on 3/25/2021 at 12:03 PM

## 2021-03-25 NOTE — OP NOTE
Operative Note      Patient: Miles Peterson  YOB: 1985  MRN: [id number]    Date of Procedure: 3/25/2021    Pre-Op Diagnosis: Previous uterine incision, GDM insulin dependant, Macrosomia     Post-Op Diagnosis: Same viable infant        Procedure(s):  REPEAT  SECTION    Surgeon(s):  July Harrell MD    Assistant:   Surgical Assistant: TREVIN Sweet CNM    Anesthesia: Spinal    Estimated Blood Loss (mL): less than 2412    Complications: None      Under spinal anesthesia the abdomen was prepared and draped . In the absence of a resident I assisted  Dr Darwin Walsh  who performed a , with retraction, exposure and delivery of a living female infant and suture management/cutting throughout the case. Then the uterus and the abdomen were closed. Procedure was well tolerated.         Electronically signed by TREVIN Sweet CNM on 3/25/2021 at 2:16 PM

## 2021-03-25 NOTE — ANESTHESIA PRE PROCEDURE
Department of Anesthesiology  Preprocedure Note       Name:  Tom Britton   Age:  38 y.o.  :  1985                                          MRN:  [id number]         Date:  3/25/2021      Surgeon: Taqueria Gill):  Padmini Lloyd MD    Procedure: Procedure(s):  REPEAT  SECTION    Medications prior to admission:   Prior to Admission medications    Medication Sig Start Date End Date Taking?  Authorizing Provider   metFORMIN (GLUCOPHAGE) 1000 MG tablet Take 1,000 mg by mouth daily    Historical Provider, MD   Cholecalciferol (VITAMIN D) 50 MCG (2000) CAPS capsule Take by mouth    Historical Provider, MD   insulin NPH (HUMULIN N;NOVOLIN N) 100 UNIT/ML injection vial Inject 30 Units into the skin every morning    Historical Provider, MD   insulin NPH (HUMULIN N;NOVOLIN N) 100 UNIT/ML injection vial Inject 12 Units into the skin nightly    Historical Provider, MD   insulin regular (HUMULIN R;NOVOLIN R) 100 UNIT/ML injection Inject into the skin See Admin Instructions 15 Units in the AM and 6 units at night    Historical Provider, MD   Prenatal Vit-Fe Fumarate-FA (PRENATAL VITAMIN PO) Take 1 tablet by mouth daily    Historical Provider, MD   aspirin 81 MG EC tablet Take 81 mg by mouth daily    Historical Provider, MD       Current medications:    Current Facility-Administered Medications   Medication Dose Route Frequency Provider Last Rate Last Admin    lactated ringers infusion   Intravenous Continuous Padmini Lloyd MD        lactated ringers bolus  1,000 mL Intravenous Once Padmini Lloyd MD        sodium chloride flush 0.9 % injection 10 mL  10 mL Intravenous 2 times per day Padmini Lloyd MD        sodium chloride flush 0.9 % injection 10 mL  10 mL Intravenous PRN Padmini Lloyd MD        citric acid-sodium citrate (BICITRA) solution 30 mL  30 mL Oral Once Padmini Lloyd MD        ceFAZolin (ANCEF) 3,000 mg in dextrose 5 % 100 mL IVPB  3 g Intravenous Once Padmini Lloyd MD           Allergies:  No Known 282 lb (127.9 kg)   10/21/20 278 lb (126.1 kg)     There is no height or weight on file to calculate BMI.    CBC:   Lab Results   Component Value Date    WBC 7.9 03/11/2021    RBC 3.44 03/11/2021    HGB 10.1 03/11/2021    HCT 32.9 03/11/2021    MCV 95.6 03/11/2021    RDW 13.8 03/11/2021     03/11/2021       CMP:   Lab Results   Component Value Date     03/11/2021    K 3.7 03/11/2021     03/11/2021    CO2 20 03/11/2021    BUN 5 03/11/2021    CREATININE 0.4 03/11/2021    GFRAA >60 03/11/2021    LABGLOM >60 03/11/2021    GLUCOSE 158 03/11/2021    PROT 6.4 03/11/2021    CALCIUM 9.0 03/11/2021    BILITOT 0.4 03/11/2021    ALKPHOS 144 03/11/2021    AST 12 03/11/2021    ALT 8 03/11/2021       POC Tests: No results for input(s): POCGLU, POCNA, POCK, POCCL, POCBUN, POCHEMO, POCHCT in the last 72 hours. Coags: No results found for: PROTIME, INR, APTT    HCG (If Applicable): No results found for: PREGTESTUR, PREGSERUM, HCG, HCGQUANT     ABGs: No results found for: PHART, PO2ART, MRO4XPN, CAW3ASR, BEART, O7ABJFFW     Type & Screen (If Applicable):  No results found for: LABABO, LABRH    Drug/Infectious Status (If Applicable):  No results found for: HIV, HEPCAB    COVID-19 Screening (If Applicable):   Lab Results   Component Value Date    COVID19 Not Detected 03/19/2021           Anesthesia Evaluation  Patient summary reviewed  Airway: Mallampati: III  TM distance: >3 FB   Neck ROM: full  Mouth opening: > = 3 FB Dental:      Comment: Dentition intact, patient denies any loose teeth.     Pulmonary:Negative Pulmonary ROS breath sounds clear to auscultation  (+) decreased breath sounds,                             Cardiovascular:  Exercise tolerance: good (>4 METS),   (+) murmur (Grade 1/6 murmur),         Rhythm: regular  Rate: normal                    Neuro/Psych:   Negative Neuro/Psych ROS              GI/Hepatic/Renal:   (+) GERD:, morbid obesity ( Super morbid obesity)          Endo/Other:    (+) DiabetesType II DM, using insulin, . Abdominal:           Vascular: negative vascular ROS. Anesthesia Plan      spinal     ASA 3     (Back-up general)        Anesthetic plan and risks discussed with patient. Plan discussed with CRNA.                 Karen Gresham MD   3/25/2021

## 2021-03-25 NOTE — ANESTHESIA POSTPROCEDURE EVALUATION
Department of Anesthesiology  Postprocedure Note    Patient: Francis Vogel  MRN: [id number]  YOB: 1985  Date of evaluation: 3/25/2021  Time:  2:27 PM     Procedure Summary     Date: 03/25/21 Room / Location: [facility] OR 01 / [address] [facility]    Anesthesia Start: 5668 Anesthesia Stop:     Procedure: [address] (N/A Abdomen) Diagnosis: (full term pregnancy)    Surgeons: Germania Delarosa MD Responsible Provider: Kelvin Navas MD    Anesthesia Type: spinal ASA Status: 3          Anesthesia Type: No value filed. Shaw Phase I: Shaw Score: 9    Shaw Phase II:      Last vitals: Reviewed and per EMR flowsheets.        Anesthesia Post Evaluation    Patient location during evaluation: bedside  Patient participation: complete - patient participated  Level of consciousness: awake and alert  Pain score: 0  Airway patency: patent  Nausea & Vomiting: no nausea and no vomiting  Complications: no  Cardiovascular status: hemodynamically stable  Respiratory status: acceptable  Hydration status: euvolemic

## 2021-03-25 NOTE — PROGRESS NOTES
37 yo  EDC 21 here to L&D ambulatory accompanied by  for scheduled Repeat  by . States Hx. Gestational Diabetes and patient on Insulin and Metformin medications. States also Hx. Pregnancy Induced Hypertension during her pregnancy and taking medications for her BP also. No c/o on arrival to unit. Oriented to Room 213. Changed into gown then voided QS with specimen obtained. Assisted into bed. IV blood work and IVF's initiated after some difficulty in drawing her blood and IV line.  supportive at bedside. Call bell in reach.

## 2021-03-26 LAB
HCT VFR BLD CALC: 31.5 % (ref 34–48)
HEMOGLOBIN: 9.7 G/DL (ref 11.5–15.5)
MCH RBC QN AUTO: 28.9 PG (ref 26–35)
MCHC RBC AUTO-ENTMCNC: 30.8 % (ref 32–34.5)
MCV RBC AUTO: 93.8 FL (ref 80–99.9)
PDW BLD-RTO: 14 FL (ref 11.5–15)
PLATELET # BLD: 214 E9/L (ref 130–450)
PMV BLD AUTO: 9.5 FL (ref 7–12)
RBC # BLD: 3.36 E12/L (ref 3.5–5.5)
WBC # BLD: 9.1 E9/L (ref 4.5–11.5)

## 2021-03-26 PROCEDURE — 85027 COMPLETE CBC AUTOMATED: CPT

## 2021-03-26 PROCEDURE — 36415 COLL VENOUS BLD VENIPUNCTURE: CPT

## 2021-03-26 PROCEDURE — 6360000002 HC RX W HCPCS: Performed by: SPECIALIST

## 2021-03-26 PROCEDURE — 2580000003 HC RX 258: Performed by: SPECIALIST

## 2021-03-26 PROCEDURE — 99024 POSTOP FOLLOW-UP VISIT: CPT | Performed by: ADVANCED PRACTICE MIDWIFE

## 2021-03-26 PROCEDURE — 1220000001 HC SEMI PRIVATE L&D R&B

## 2021-03-26 PROCEDURE — 6370000000 HC RX 637 (ALT 250 FOR IP): Performed by: SPECIALIST

## 2021-03-26 RX ADMIN — OXYCODONE AND ACETAMINOPHEN 2 TABLET: 5; 325 TABLET ORAL at 19:36

## 2021-03-26 RX ADMIN — DOCUSATE SODIUM 100 MG: 100 CAPSULE, LIQUID FILLED ORAL at 07:51

## 2021-03-26 RX ADMIN — KETOROLAC TROMETHAMINE 30 MG: 30 INJECTION, SOLUTION INTRAMUSCULAR at 03:22

## 2021-03-26 RX ADMIN — OXYCODONE AND ACETAMINOPHEN 2 TABLET: 5; 325 TABLET ORAL at 15:39

## 2021-03-26 RX ADMIN — IBUPROFEN 600 MG: 600 TABLET, FILM COATED ORAL at 22:13

## 2021-03-26 RX ADMIN — OXYCODONE AND ACETAMINOPHEN 2 TABLET: 5; 325 TABLET ORAL at 06:35

## 2021-03-26 RX ADMIN — IBUPROFEN 600 MG: 600 TABLET, FILM COATED ORAL at 09:33

## 2021-03-26 RX ADMIN — ENOXAPARIN SODIUM 60 MG: 60 INJECTION SUBCUTANEOUS at 09:32

## 2021-03-26 RX ADMIN — OXYCODONE AND ACETAMINOPHEN 2 TABLET: 5; 325 TABLET ORAL at 11:30

## 2021-03-26 RX ADMIN — OXYCODONE AND ACETAMINOPHEN 2 TABLET: 5; 325 TABLET ORAL at 23:42

## 2021-03-26 RX ADMIN — SODIUM CHLORIDE, POTASSIUM CHLORIDE, SODIUM LACTATE AND CALCIUM CHLORIDE 1000 ML: 600; 310; 30; 20 INJECTION, SOLUTION INTRAVENOUS at 03:16

## 2021-03-26 ASSESSMENT — PAIN SCALES - GENERAL
PAINLEVEL_OUTOF10: 7
PAINLEVEL_OUTOF10: 8

## 2021-03-26 ASSESSMENT — PAIN DESCRIPTION - DESCRIPTORS: DESCRIPTORS: BURNING;SORE;TENDER

## 2021-03-26 NOTE — PROGRESS NOTES
Assumed care of patient. Physical assessment completed. Plan of care discussed. Call light within reach.

## 2021-03-26 NOTE — PROGRESS NOTES
Holloway catheter removed at this time, osmin care performed. Pt instructed to call out when has urge to void for assistance to bathroom. IV fluids discontinued. PO fluids encouraged. Breastfeeding assistance offered,  successfully latched to left side. General

## 2021-03-26 NOTE — PROGRESS NOTES
Assumed care of pt for 11-7 shift, POC discussed for shift with understanding verbalized. VS and assessment completed. IVF infusing per order, Holloway intact and patent draining yellow urine. Pt denies pain or discomfort at this time. FOB supportive at bedside. Plans to send  to nursery at this time for awhile so she can rest. Call light in reach, instructed to call with needs. Rest encouraged.

## 2021-03-26 NOTE — PROGRESS NOTES
Subjective:     Postpartum Day 1:  Delivery    The patient feels well. The patient denies emotional concerns. Pain is well controlled with current medications. The baby iswell. Feeding via bottle - Similac with iron. Urinary output is adequate. The patient is ambulating well. The patient is tolerating a normal diet. Flatus has been passed. Objective:      Patient Vitals for the past 8 hrs:   BP Temp Temp src Pulse Resp SpO2   21 1222 (!) 145/79 98.8 °F (37.1 °C) Oral 97 18 97 %   21 0729 -- 98.2 °F (36.8 °C) Oral -- 18 --     General:    alert, appears stated age and cooperative   Bowel Sounds:  active   Lochia:  appropriate   Uterine Fundus:   firm   Incision:  aquacel dressing D and I   DVT Evaluation:  No cords or calf tenderness. No significant calf/ankle edema. CBC:   Lab Results   Component Value Date    WBC 9.1 2021    RBC 3.36 2021    HGB 9.7 2021    HCT 31.5 2021    MCV 93.8 2021    RDW 14.0 2021     2021         Assessment:     Status post  section. Doing well postoperatively. Plan:     Continue current care.

## 2021-03-27 VITALS
SYSTOLIC BLOOD PRESSURE: 143 MMHG | BODY MASS INDEX: 44.41 KG/M2 | TEMPERATURE: 98.4 F | DIASTOLIC BLOOD PRESSURE: 79 MMHG | WEIGHT: 293 LBS | HEIGHT: 68 IN | OXYGEN SATURATION: 98 % | RESPIRATION RATE: 18 BRPM | HEART RATE: 91 BPM

## 2021-03-27 PROBLEM — O26.90 PREGNANCY, COMPLICATED: Status: RESOLVED | Noted: 2021-02-16 | Resolved: 2021-03-27

## 2021-03-27 PROBLEM — Z3A.17 17 WEEKS GESTATION OF PREGNANCY: Status: RESOLVED | Noted: 2020-11-02 | Resolved: 2021-03-27

## 2021-03-27 PROBLEM — Z3A.39 PREGNANCY WITH 39 COMPLETED WEEKS GESTATION: Status: RESOLVED | Noted: 2021-03-11 | Resolved: 2021-03-27

## 2021-03-27 PROBLEM — O34.29 PREGNANCY WITH HISTORY OF UTERINE MYOMECTOMY: Status: RESOLVED | Noted: 2020-10-21 | Resolved: 2021-03-27

## 2021-03-27 PROBLEM — O24.414 INSULIN CONTROLLED GESTATIONAL DIABETES MELLITUS (GDM) IN THIRD TRIMESTER: Status: RESOLVED | Noted: 2020-10-21 | Resolved: 2021-03-27

## 2021-03-27 PROBLEM — O99.212 OBESITY COMPLICATING PERIPREGNANCY, ANTEPARTUM, SECOND TRIMESTER: Status: RESOLVED | Noted: 2020-10-21 | Resolved: 2021-03-27

## 2021-03-27 PROCEDURE — 6370000000 HC RX 637 (ALT 250 FOR IP): Performed by: SPECIALIST

## 2021-03-27 RX ORDER — OXYCODONE HYDROCHLORIDE AND ACETAMINOPHEN 5; 325 MG/1; MG/1
1 TABLET ORAL EVERY 6 HOURS PRN
Qty: 28 TABLET | Refills: 0 | Status: SHIPPED | OUTPATIENT
Start: 2021-03-27 | End: 2021-04-03

## 2021-03-27 RX ADMIN — OXYCODONE AND ACETAMINOPHEN 2 TABLET: 5; 325 TABLET ORAL at 03:35

## 2021-03-27 RX ADMIN — IBUPROFEN 600 MG: 600 TABLET, FILM COATED ORAL at 14:12

## 2021-03-27 RX ADMIN — IBUPROFEN 600 MG: 600 TABLET, FILM COATED ORAL at 07:32

## 2021-03-27 RX ADMIN — DOCUSATE SODIUM 100 MG: 100 CAPSULE, LIQUID FILLED ORAL at 07:32

## 2021-03-27 RX ADMIN — OXYCODONE AND ACETAMINOPHEN 2 TABLET: 5; 325 TABLET ORAL at 11:33

## 2021-03-27 ASSESSMENT — PAIN SCALES - GENERAL: PAINLEVEL_OUTOF10: 3

## 2021-03-27 NOTE — PROGRESS NOTES
Discharge instructions and prescription reviewed with patient; discharge form signed by patient and placed in soft chart.

## 2021-03-27 NOTE — DISCHARGE SUMMARY
Obstetrical Discharge Form         Patients Name  Luis Carlos Marie    Gestational Age:  38w0d    Antepartum complications: gestational diabetes    Date of Delivery:   3/25/21      Type of Delivery:    for Repeat    Delivered By:     Candida Music:       Information for the patient's :  Michelrangeltrista Harris Girl Quynh Pair [[id number]]        Anesthesia:    Spinal    Intrapartum complications: None    Feeding method:   breast    Postpartum complications: none    Discharge condition:   Stable    Discharge Date:   3/27/2021    Plan:   Follow up    in 1 week(s)

## 2024-03-01 PROBLEM — D64.9 ANEMIA: Status: ACTIVE | Noted: 2024-03-01

## 2024-09-14 PROBLEM — K64.9 HEMORRHOIDS: Status: ACTIVE | Noted: 2024-09-14

## 2024-09-14 PROBLEM — E11.9 TYPE 2 DIABETES MELLITUS WITHOUT COMPLICATION, WITHOUT LONG-TERM CURRENT USE OF INSULIN (HCC): Status: ACTIVE | Noted: 2024-09-14

## 2024-09-14 PROBLEM — K59.00 CONSTIPATION: Status: ACTIVE | Noted: 2024-09-14

## 2025-02-11 ENCOUNTER — HOSPITAL ENCOUNTER (OUTPATIENT)
Dept: MAMMOGRAPHY | Age: 40
Discharge: HOME | End: 2025-02-13
Payer: COMMERCIAL

## 2025-02-11 DIAGNOSIS — Z12.31 ENCOUNTER FOR SCREENING MAMMOGRAM FOR MALIGNANT NEOPLASM OF BREAST: ICD-10-CM

## 2025-02-11 PROCEDURE — 77063 BREAST TOMOSYNTHESIS BI: CPT

## (undated) DEVICE — CHLORAPREP 26ML ORANGE

## (undated) DEVICE — LINER,SOFT,SUCTION CANISTER,1500CC: Brand: MEDLINE

## (undated) DEVICE — GOWN,SIRUS,FABRNF,XL,20/CS: Brand: MEDLINE

## (undated) DEVICE — TELFA ADHESIVE ISLAND DRESSING: Brand: TELFA

## (undated) DEVICE — DRESSING SUPERABSORBENT W4XL4IN 4 LAYR NONWOVEN FLD

## (undated) DEVICE — BLADE CLIPPER GEN PURP NS

## (undated) DEVICE — PAD ABD CURITY TENDERSORB 5X9IN

## (undated) DEVICE — TUBE BLD COLLECT ST 1 SIL COAT 7ML 10ML

## (undated) DEVICE — STERILE POLYISOPRENE POWDER-FREE SURGICAL GLOVES WITH EMOLLIENT COATING: Brand: PROTEXIS

## (undated) DEVICE — SUTURE SZ 2-0 L27IN ABSRB BRN CTX L36MM 1/2 CIR SGL ARMED 872H

## (undated) DEVICE — APPLICATOR PREP 6ML 0.7% IOD POVACRYLEX 74% ISO ALC

## (undated) DEVICE — CESAREAN BIRTH PACK: Brand: MEDLINE INDUSTRIES, INC.

## (undated) DEVICE — ELECTRODE PT RET AD L9FT HI MOIST COND ADH HYDRGEL CORDED

## (undated) DEVICE — SUTURE VCRL 3-0 L36IN ABSRB UD CT L40MM 1/2 CIR TAPERPOINT J956H

## (undated) DEVICE — SLEEVE COMPRESS STD CALF KNEE SCD

## (undated) DEVICE — STANDARD HYPODERMIC NEEDLE,POLYPROPYLENE HUB: Brand: MONOJECT

## (undated) DEVICE — 3M™ STERI-STRIP™ ELASTIC SKIN CLOSURES, E4547, 1/2 IN X 4 IN (12 MM X 100 MM), 6 STRIPS/ENVELOPE: Brand: 3M™ STERI-STRIP™

## (undated) DEVICE — GLOVE,SURG,SENSICARE SLT,LF,PF,6: Brand: MEDLINE

## (undated) DEVICE — NEEDLE HYPO 22GA L1IN BLK POLYPR HUB S STL REG BVL STR W/O

## (undated) DEVICE — SCALPEL SURG NO10 S STL ABS PLAS HNDL DISPOSABLE

## (undated) DEVICE — SUTURE VCRL 3-0 L36IN ABSRB VLT CT-1 L36MM 1/2 CIR J344H

## (undated) DEVICE — SUTURE VCRL SZ 0 L36IN ABSRB VLT L36MM CT-1 1/2 CIR J346H

## (undated) DEVICE — 1.5L THIN WALL CAN: Brand: CRD

## (undated) DEVICE — STAPLER SKIN SQ 30 ABSRB STPL DISP INSORB

## (undated) DEVICE — TRAY CATH CATH OD16FR 200ML URIN M SIL CNTR ENTRY F

## (undated) DEVICE — MASTISOL ADHESIVE LIQ 2/3ML

## (undated) DEVICE — SUTURE VCRL SZ 4-0 L18IN ABSRB UD L19MM PS-2 3/8 CIR PRIM J496H

## (undated) DEVICE — SUTURE VCRL SZ 0 L27IN ABSRB VLT L48MM CTX 1/2 CIR TAPR PNT J364H

## (undated) DEVICE — GLOVE,SURG,SENSICARE SLT,LF,PF,6.5: Brand: MEDLINE